# Patient Record
Sex: MALE | Race: WHITE | NOT HISPANIC OR LATINO | Employment: OTHER | URBAN - METROPOLITAN AREA
[De-identification: names, ages, dates, MRNs, and addresses within clinical notes are randomized per-mention and may not be internally consistent; named-entity substitution may affect disease eponyms.]

---

## 2022-09-19 LAB — HCV AB SER-ACNC: NEGATIVE

## 2023-05-24 ENCOUNTER — TELEMEDICINE (OUTPATIENT)
Dept: FAMILY MEDICINE CLINIC | Facility: CLINIC | Age: 73
End: 2023-05-24

## 2023-05-24 DIAGNOSIS — Z74.8 ASSISTANCE NEEDED WITH TRANSPORTATION: ICD-10-CM

## 2023-05-24 DIAGNOSIS — Z89.432 S/P TRANSMETATARSAL AMPUTATION OF FOOT, LEFT (HCC): ICD-10-CM

## 2023-05-24 DIAGNOSIS — Z79.4 TYPE 2 DIABETES MELLITUS WITHOUT COMPLICATION, WITH LONG-TERM CURRENT USE OF INSULIN (HCC): Primary | ICD-10-CM

## 2023-05-24 DIAGNOSIS — E11.9 TYPE 2 DIABETES MELLITUS WITHOUT COMPLICATION, WITH LONG-TERM CURRENT USE OF INSULIN (HCC): Primary | ICD-10-CM

## 2023-05-24 DIAGNOSIS — E78.2 MIXED HYPERLIPIDEMIA: ICD-10-CM

## 2023-05-24 PROBLEM — R91.1 RIGHT UPPER LOBE PULMONARY NODULE: Status: ACTIVE | Noted: 2023-01-16

## 2023-05-24 PROBLEM — E11.42 DIABETIC POLYNEUROPATHY ASSOCIATED WITH TYPE 2 DIABETES MELLITUS (HCC): Status: ACTIVE | Noted: 2019-02-18

## 2023-05-24 PROBLEM — Z87.898 HISTORY OF ULCERATION: Status: ACTIVE | Noted: 2021-04-10

## 2023-05-24 PROBLEM — R26.9 GAIT ABNORMALITY: Status: ACTIVE | Noted: 2022-12-20

## 2023-05-24 PROBLEM — I70.25 ATHEROSCLEROSIS OF NATIVE ARTERIES OF THE EXTREMITIES WITH ULCERATION (HCC): Status: ACTIVE | Noted: 2022-10-29

## 2023-05-24 PROBLEM — M62.84 SARCOPENIA: Status: ACTIVE | Noted: 2022-12-16

## 2023-05-24 PROBLEM — L97.519 ULCER OF RIGHT FOOT (HCC): Status: ACTIVE | Noted: 2020-07-29

## 2023-05-24 RX ORDER — INSULIN ASPART 100 [IU]/ML
10 INJECTION, SOLUTION INTRAVENOUS; SUBCUTANEOUS
Qty: 9 ML | Refills: 3 | Status: SHIPPED | OUTPATIENT
Start: 2023-05-24 | End: 2023-06-23

## 2023-05-24 RX ORDER — ROSUVASTATIN CALCIUM 10 MG/1
10 TABLET, COATED ORAL DAILY
COMMUNITY
Start: 2022-10-20 | End: 2023-10-20

## 2023-05-24 RX ORDER — ASPIRIN 81 MG/1
81 TABLET, CHEWABLE ORAL DAILY
COMMUNITY
Start: 2022-10-20 | End: 2023-10-20

## 2023-05-24 NOTE — PROGRESS NOTES
Virtual Regular Visit    Verification of patient location:    Patient is located at Home in the following state in which I hold an active license [place]      Assessment/Plan:    Problem List Items Addressed This Visit        Endocrine    Type 2 diabetes mellitus, with long-term current use of insulin ([facility] ) - Primary     Current on insulin; due for labs; advised needs in office follow up due to extremity risk given hx of amputation; will refer to complex care management   Lab Results   Component Value Date    HGBA1C 6 8 (H) 01/04/2023            Relevant Medications    insulin aspart (NovoLOG FlexPen) 100 UNIT/ML injection pen    Other Relevant Orders    Albumin / creatinine urine ratio    Comprehensive metabolic panel    Hemoglobin A1C    Lipid Panel with Direct LDL reflex    CBC and differential    TSH, 3rd generation with Free T4 reflex    Ambulatory Referral to Complex Care Management Program       Other    S/P transmetatarsal amputation of foot, left (HCC)     Has not followed up with orthopedics as recommended due to transportation issues; still has home care; advised need for evaluation of foot by surgeon and f/u in office to ensure appropriate healing since surgery was months ago; referred to resources in network as transportation seems to be limiting factor in seeking appropriate care; discussed risks of not having appropriate f/u and evaluation to ensure no infection and appropriate healing         Relevant Orders    Ambulatory Referral to Complex Care Management Program    Ambulatory Referral to Podiatry    Mixed hyperlipidemia     Advised statin which he had not been taking; check labs         Relevant Medications    rosuvastatin (CRESTOR) 10 MG tablet    Assistance needed with transportation     Due to recent foot amputation and wife had recent CVA; they are currently homebound without family or friend resources to help with transportation and ensure patient re-establishes to get appropriate care Relevant Orders    Ambulatory Referral to Complex Care Management Program            Reason for visit is No chief complaint on file  Encounter provider Jcarlos Salas DO    Provider located at [facility] [address]  [address] [facility] [address] [phone]      Recent Visits  Date Type Provider Dept   05/24/23 Telemedicine Jcarlos Salas DO [id number] [address] recent visits within past 7 days and meeting all other requirements  Future Appointments  No visits were found meeting these conditions  Showing future appointments within next 150 days and meeting all other requirements       The patient was identified by name and date of birth  Mckenzie Brewer was informed that this is a telemedicine visit and that the visit is being conducted through the [employer]  He agrees to proceed     My office door was closed  No one else was in the room  He acknowledged consent and understanding of privacy and security of the video platform  The patient has agreed to participate and understands they can discontinue the visit at any time  Patient is aware this is a billable service  Subjective  Mckenzie Brewer is a 72 y o  male is to establish care  Patient presents virtually today to establish care  Has not been to doctor since recent post-op follow up due to transportation issues  He is unable to drive and wife recently had stroke  Has been having home care for wound for recent metatarsal foot amputation  Patient had surgery in December and is in wheelchair  Had debridement and amputation at that time  He is having difficulty with mobility  Unable to come to office per patient  Has not had appropriate follow up with orthopedics per chart review and has not scheduled f/u appointments despite outreach to the patient  Patient is on insulin for DM  No recent labs  -200  Sometimes a little higher if eating carbs   Tries do do Mediterranean diet as he can  Has not been taking statin  He does take novolog TID 10-12 units and basaglar 12 units daily  History reviewed  No pertinent past medical history  History reviewed  No pertinent surgical history  Current Outpatient Medications   Medication Sig Dispense Refill   • aspirin 81 mg chewable tablet Chew 81 mg daily     • insulin aspart (NovoLOG FlexPen) 100 UNIT/ML injection pen Inject 10 Units under the skin 3 (three) times a day with meals 9 mL 3   • rosuvastatin (CRESTOR) 10 MG tablet Take 10 mg by mouth daily       No current facility-administered medications for this visit  Allergies   Allergen Reactions   • Pollen Extract Itching       Review of Systems   Constitutional: Negative for chills and fever  Respiratory: Negative for shortness of breath  Cardiovascular: Negative for chest pain  Musculoskeletal: Positive for gait problem  Video Exam    There were no vitals filed for this visit  Physical Exam  Constitutional:       Appearance: He is normal weight  HENT:      Head: Normocephalic and atraumatic  Neurological:      General: No focal deficit present  Mental Status: He is alert and oriented to person, place, and time  Psychiatric:         Mood and Affect: Mood normal          Behavior: Behavior normal          Thought Content:  Thought content normal          Judgment: Judgment normal           Visit Time  Total Visit Duration: 20

## 2023-05-25 RX ORDER — INSULIN GLARGINE 100 [IU]/ML
INJECTION, SOLUTION SUBCUTANEOUS
COMMUNITY
Start: 2023-04-12

## 2023-05-25 NOTE — ASSESSMENT & PLAN NOTE
Due to recent foot amputation and wife had recent CVA; they are currently homebound without family or friend resources to help with transportation and ensure patient re-establishes to get appropriate care

## 2023-05-25 NOTE — ASSESSMENT & PLAN NOTE
Current on insulin; due for labs; advised needs in office follow up due to extremity risk given hx of amputation; will refer to complex care management   Lab Results   Component Value Date    HGBA1C 6 8 (H) 01/04/2023

## 2023-05-25 NOTE — ASSESSMENT & PLAN NOTE
Has not followed up with orthopedics as recommended due to transportation issues; still has home care; advised need for evaluation of foot by surgeon and f/u in office to ensure appropriate healing since surgery was months ago; referred to resources in network as transportation seems to be limiting factor in seeking appropriate care; discussed risks of not having appropriate f/u and evaluation to ensure no infection and appropriate healing

## 2023-05-26 ENCOUNTER — PATIENT OUTREACH (OUTPATIENT)
Dept: FAMILY MEDICINE CLINIC | Facility: CLINIC | Age: 73
End: 2023-05-26

## 2023-05-26 DIAGNOSIS — Z71.89 COMPLEX CARE COORDINATION: Primary | ICD-10-CM

## 2023-05-26 NOTE — PROGRESS NOTES
Outpatient Care Management Note:    New direct referral received from Dr Oliva Askew  Patient needs transportation and home services  He is in wheelchair and his wife had a recent stroke  CM will place a referral to social work to address

## 2023-05-31 ENCOUNTER — PATIENT OUTREACH (OUTPATIENT)
Dept: FAMILY MEDICINE CLINIC | Facility: CLINIC | Age: 73
End: 2023-05-31

## 2023-05-31 NOTE — PROGRESS NOTES
DARSHAN LO received a referral from RN CM as patient is in need of transportation resources and help in home as his wife recently had a stroke  Patient is wheelchair bound  DARSHAN LO reviewed patient's chart and called patient ([phone])  Patient did not answer, DARSHAN LO left a message including DARSHAN LO contact information and requested a call back  DARSHAN LO will attempt to outreach again at a later date

## 2023-06-09 ENCOUNTER — TELEPHONE (OUTPATIENT)
Dept: FAMILY MEDICINE CLINIC | Facility: CLINIC | Age: 73
End: 2023-06-09

## 2023-06-09 ENCOUNTER — PATIENT OUTREACH (OUTPATIENT)
Dept: FAMILY MEDICINE CLINIC | Facility: CLINIC | Age: 73
End: 2023-06-09

## 2023-06-09 NOTE — PROGRESS NOTES
Outpatient Care Management Note:    Care manager called Logan Mccormack, introduced myself and the care management program   Logan Mccormack is being followed by [facility] for dressing changes every other day  They are doing dressing changes to his left foot after his toe amputation in December  CM reviewed that Dr Meri Gunderson feels he needs to follow up with his orthopedic surgeon  Logan Mccormack states that he is not going back to [facility]  He was unhappy with his care  Dr Meri Gunderson did refer him to podiatry and he is willing to see Dr Raven Vazquez  He will call to schedule  CM reinforced the importance of follow up to prevent infection, monitor healing etc   Logan Mccormack does need transportation assistance  He is in a wheelchair and his wife recently had a stroke  oLgan Mccormack states he is able to walk with a walker  CM did review that our  tried to contact him  He will return her call to see if he is eligible for any services  We completed a med review  Logan Mccormack is not taking his crestor or his aspirin  He states the aspirin upsets his stomach  I instructed him to take it with food  He did not have a reason for not taking the crestor  CM did review his increased risk of stroke and complications related to diabetes  He will consider taking his medications as iwllian Latham checks blood sugars  This morning his sugar was 124  Yesterday, it was 111  He states that he is taking his insulin  He does self adjust his dose based off his sugar numbers  He is concerned that he was told he can not refill his novolog  He does not know why  Currently, he still has about 1-2 months of novolog insulin at home  I instructed him to call [facility] and determine the reason  It may be related to his insurance  CM also reviewed that Logan Mccormack needs to complete his PCP blood work  I gave him the contact information for mobile lab  He will check with his insurance to be sure he can use [email or web address]  Cm gave Logan Mccormack my contact information   He will call with any questions

## 2023-06-16 LAB — HBA1C MFR BLD HPLC: 8.1 %

## 2023-06-19 ENCOUNTER — PATIENT OUTREACH (OUTPATIENT)
Dept: FAMILY MEDICINE CLINIC | Facility: CLINIC | Age: 73
End: 2023-06-19

## 2023-06-19 LAB
CREAT ?TM UR-SCNC: 105.3 UMOL/L
EXT ALBUMIN URINE RANDOM: 1.2
MICROALBUMIN/CREAT UR: 11.4 MG/G{CREAT}

## 2023-06-19 NOTE — PROGRESS NOTES
DARSHAN LO received a referral from RN CM as patient is in need of transportation resources and help in home as his wife recently had a stroke  Patient is wheelchair bound  DARSHAN LO reviewed patient's chart and called patient ([phone]) a second time  Patient did not answer, DARSHAN LO left a message including DARSHAN LO contact information and requested a call back  DARSHAN LO will send unable to reach letter via [employer]  DARSHAN LO will close due to being unable to reach  Please re consult DARSHAN LO as needed

## 2023-06-19 NOTE — LETTER
[id number]   [facility] [address]  [phone]    Re: Aristeo Mae to Reach   6/19/2023       Dear Kamran cisneros [address] [facility] [address] [facility] Work  and wanted to be certain you had information to contact me should you desire assistance with or have questions about non-medical aspects of your care such as [but not limited to] medical insurance, housing, transportation, material needs, or emergency needs  If I do not have an answer I will assist you in finding the appropriate agency or individual who can help  Please feel free to contact me at [phone]  Thank You      Sincerely,         Faviola Kathleen MSMAGDA, [facility]

## 2023-06-20 ENCOUNTER — TELEPHONE (OUTPATIENT)
Dept: FAMILY MEDICINE CLINIC | Facility: CLINIC | Age: 73
End: 2023-06-20

## 2023-06-20 ENCOUNTER — PATIENT OUTREACH (OUTPATIENT)
Dept: FAMILY MEDICINE CLINIC | Facility: CLINIC | Age: 73
End: 2023-06-20

## 2023-06-20 NOTE — PROGRESS NOTES
DARSHAN LO received a voicemail from patient's wife, Tosinzen Crystaldaniel ([phone])  DARSHAN LO attempted to call Tosin Bang back  Patient answered and DARSHAN discussed initial reason for referral  Patient seemed to be unsure of transportation and help in home needs  Patient stated he will have his wife call DARSHAN LO back  DARSHAN LO will await for a call back from Tosin Bang

## 2023-06-20 NOTE — TELEPHONE ENCOUNTER
----- Message from Harper Reeder, [address] sent at 6/20/2023  1:11 PM EDT -----  Regarding: appt time  I changed appointment time 7/6/23 from 9:40 to 9:20 to allow more time with Dr Mindi Gomez  Please call patient to let him know

## 2023-06-23 ENCOUNTER — PATIENT OUTREACH (OUTPATIENT)
Dept: FAMILY MEDICINE CLINIC | Facility: CLINIC | Age: 73
End: 2023-06-23

## 2023-06-23 NOTE — PROGRESS NOTES
Outpatient Care Management Note:    Care manager called Mir Parker  He is still being followed by [place]  He states that his foot is improving  He is able to shower 3 days per week  CM reminded him that he needs post operative follow up  Mir Parker refuses to go back to [address] [place] [address] [address] orthopedics  He has considered seeing Dr Jason Dumont, podiatry, but has not scheduled  CM reviewed that Dr Jason Dumont is only in on Wednesdays  I recommended he see Dr Sowmya Oro on 7/6 and then work on scheduling Dr Jason Dumont  Mir Parker is unsure how he will get to his office appt  I reviewed that our  tried to call him to assist with transportation needs  Mir Parker declined Souleymane Ruiz  He wants to ask a friend  He would need a wheelchair once he gets to office  CM will verify if there are wheelchairs downstairs  Mir Parker states his blood sugars are good  This morning it was 121  His [id number]=0 1 on 6/16/23  Mir Parker continues to Not take his crestor or aspirin  I reinforced why these medications are important  Mir Parker is diabetic and at increased risk of stroke  He continues to decline taking meds as ordered  He will discuss with Dr Sowmya Oro at his appt  CM also asked if he followed up on why the pharmacy told him they could not refill his novolog  He will call the pharmacy and will call me back so we can resolve the issue before he runs out of his insulin  Vondanessa Johnston did complete his blood work  Mir Parker has my contact information and will call me back with any updates or questions  CM called Mir Troncosoberkley back and informed him that there are about 5 wheelchairs at the front entrance

## 2023-06-30 ENCOUNTER — RA CDI HCC (OUTPATIENT)
Dept: OTHER | Facility: HOSPITAL | Age: 73
End: 2023-06-30

## 2023-06-30 NOTE — PROGRESS NOTES
[facility]  coding opportunities       Chart reviewed, no opportunity found: CHART REVIEWED, NO OPPORTUNITY FOUND        Patients Insurance        Commercial Insurance: [employer]

## 2023-07-06 ENCOUNTER — TELEPHONE (OUTPATIENT)
Dept: ADMINISTRATIVE | Facility: OTHER | Age: 73
End: 2023-07-06

## 2023-07-06 ENCOUNTER — OFFICE VISIT (OUTPATIENT)
Dept: FAMILY MEDICINE CLINIC | Facility: CLINIC | Age: 73
End: 2023-07-06
Payer: COMMERCIAL

## 2023-07-06 VITALS
BODY MASS INDEX: 25.71 KG/M2 | WEIGHT: 173.6 LBS | HEART RATE: 96 BPM | OXYGEN SATURATION: 97 % | DIASTOLIC BLOOD PRESSURE: 70 MMHG | HEIGHT: 69 IN | SYSTOLIC BLOOD PRESSURE: 140 MMHG | TEMPERATURE: 98.5 F

## 2023-07-06 DIAGNOSIS — Z79.4 TYPE 2 DIABETES MELLITUS WITHOUT COMPLICATION, WITH LONG-TERM CURRENT USE OF INSULIN (HCC): Primary | ICD-10-CM

## 2023-07-06 DIAGNOSIS — R91.1 RIGHT UPPER LOBE PULMONARY NODULE: ICD-10-CM

## 2023-07-06 DIAGNOSIS — I70.25 ATHEROSCLEROSIS OF NATIVE ARTERIES OF THE EXTREMITIES WITH ULCERATION (HCC): ICD-10-CM

## 2023-07-06 DIAGNOSIS — E11.9 TYPE 2 DIABETES MELLITUS WITHOUT COMPLICATION, WITH LONG-TERM CURRENT USE OF INSULIN (HCC): Primary | ICD-10-CM

## 2023-07-06 DIAGNOSIS — L97.519 ULCER OF RIGHT FOOT, UNSPECIFIED ULCER STAGE (HCC): ICD-10-CM

## 2023-07-06 DIAGNOSIS — E11.319 DIABETIC RETINOPATHY OF BOTH EYES ASSOCIATED WITH TYPE 2 DIABETES MELLITUS, MACULAR EDEMA PRESENCE UNSPECIFIED, UNSPECIFIED RETINOPATHY SEVERITY (HCC): ICD-10-CM

## 2023-07-06 DIAGNOSIS — E11.42 DIABETIC POLYNEUROPATHY ASSOCIATED WITH TYPE 2 DIABETES MELLITUS (HCC): ICD-10-CM

## 2023-07-06 PROBLEM — J96.01 ACUTE HYPOXEMIC RESPIRATORY FAILURE (HCC): Status: ACTIVE | Noted: 2023-07-06

## 2023-07-06 PROBLEM — J96.01 ACUTE HYPOXEMIC RESPIRATORY FAILURE (HCC): Status: RESOLVED | Noted: 2023-07-06 | Resolved: 2023-07-06

## 2023-07-06 LAB
LEFT EYE DIABETIC RETINOPATHY: ABNORMAL
LEFT EYE IMAGE QUALITY: ABNORMAL
LEFT EYE MACULAR EDEMA: ABNORMAL
LEFT EYE OTHER RETINOPATHY: ABNORMAL
RIGHT EYE DIABETIC RETINOPATHY: ABNORMAL
RIGHT EYE IMAGE QUALITY: ABNORMAL
RIGHT EYE MACULAR EDEMA: ABNORMAL
RIGHT EYE OTHER RETINOPATHY: ABNORMAL
SEVERITY (EYE EXAM): ABNORMAL

## 2023-07-06 PROCEDURE — 99214 OFFICE O/P EST MOD 30 MIN: CPT | Performed by: FAMILY MEDICINE

## 2023-07-06 PROCEDURE — 92250 FUNDUS PHOTOGRAPHY W/I&R: CPT | Performed by: FAMILY MEDICINE

## 2023-07-06 RX ORDER — ROSUVASTATIN CALCIUM 5 MG/1
5 TABLET, COATED ORAL DAILY
Qty: 90 TABLET | Refills: 3 | Status: SHIPPED | OUTPATIENT
Start: 2023-07-06

## 2023-07-06 NOTE — PROGRESS NOTES
Assessment/Plan:     1. Type 2 diabetes mellitus without complication, with long-term current use of insulin (HCC)  Assessment & Plan:  A1C increased from last check; likely this is related to his diet changes which we reviewed diet modifications need to keep BS controlled; if continues to have high BS will need to increase basaglar; defers referral to endocrinology; c/w complex care management for concern of difficulty with follow up  Lab Results   Component Value Date    HGBA1C 8.1 (H) 06/16/2023       Orders:  -     IRIS Diabetic eye exam  -     Ambulatory Referral to Podiatry; Future  -     Comprehensive metabolic panel; Future; Expected date: 10/06/2023  -     Hemoglobin A1C; Future; Expected date: 10/06/2023  -     Lipid Panel with Direct LDL reflex; Future; Expected date: 10/06/2023    2. Ulcer of right foot, unspecified ulcer stage ([address])  Assessment & Plan:  Has home wound care; refused exam today due to refusal of removing wound care dressing; advised need to have wound care f/u with appearance and stressed need for podiatry ASAP; R foot appears to have no extensive ulcerations or infection but has poor circulation and sensory loss      3. Atherosclerosis of native arteries of the extremities with ulceration ([address])  Assessment & Plan:  Stressed importance of ASA and statin; more agreeable to take lower dose statin so changed dosage to have some compliance; f/u with vascular surgery    Orders:  -     rosuvastatin (CRESTOR) 5 mg tablet; Take 1 tablet (5 mg total) by mouth daily  -     Ambulatory Referral to Vascular Surgery; Future    4.  Diabetic polyneuropathy associated with type 2 diabetes mellitus ([address])  Assessment & Plan:  Overall patient has had poor follow up since foot surgery, this likely is multifactorial but mostly concerns with wife at home and transportation issues; he is following with complex care management to f/u with patient; pt refused exam of left foot today; stressed importance of f/u with podiatry; right foot shows healing superficial pressure/erythema; c/w wound care  Lab Results   Component Value Date    HGBA1C 8.1 (H) 06/16/2023         5. Right upper lobe pulmonary nodule  Assessment & Plan:  Advised need for repeat CT scan    Orders:  -     CT chest wo contrast; Future; Expected date: 07/06/2023        Subjective:      Patient ID: Amaris Ceballos is a 72 y.o. male. He does have home care helping with wound care. Patient admits he has been very sedentary at home. He does do home exercises on occasion. Has not followed up with podiatry or surgeon recently. DM: Patient states depending on his BS reading. If it is in range of 100-150, he will take 10-15 units. Patient is on basaglar at night: He takes 20-21 units. Pt had 1 low blood sugar of 60 a few weeks ago. BS has not been over 240. Mostly in 100s-150s if he is watching what he eats. He has been doing [employer] and neighbors who have helped him with getting groceries. Has not been taking asa or statin. Feels he does not need it. The following portions of the patient's history were reviewed and updated as appropriate: allergies, current medications, past family history, past medical history, past social history, past surgical history, and problem list.    Review of Systems   Constitutional: Negative for chills and fever. Musculoskeletal: Positive for gait problem. Skin: Positive for wound. Negative for color change and rash. Objective:      /70   Pulse 96   Temp 98.5 °F (36.9 °C)   Ht 5' 9.29" (1.76 m)   Wt 78.7 kg (173 lb 9.6 oz)   SpO2 97%   BMI 25.42 kg/m²          Physical Exam  Vitals reviewed. Constitutional:       General: He is not in acute distress. Appearance: Normal appearance. He is not ill-appearing, toxic-appearing or diaphoretic. HENT:      Head: Normocephalic and atraumatic. Eyes:      General: No scleral icterus. Right eye: No discharge.          Left eye: No discharge. Conjunctiva/sclera: Conjunctivae normal.   Cardiovascular:      Rate and Rhythm: Normal rate and regular rhythm. Pulses: Pulses are weak. Dorsalis pedis pulses are 1+ on the right side. Posterior tibial pulses are 1+ on the right side. Heart sounds: Normal heart sounds. No murmur heard. No gallop. Pulmonary:      Effort: Pulmonary effort is normal. No respiratory distress. Breath sounds: Normal breath sounds. No stridor. No wheezing, rhonchi or rales. Musculoskeletal:      Right lower leg: No edema. Left lower leg: No edema. Feet:      Right foot:      Skin integrity: Callus present. No erythema. Neurological:      General: No focal deficit present. Mental Status: He is alert and oriented to person, place, and time. Psychiatric:         Mood and Affect: Mood normal.         Behavior: Behavior normal.         Thought Content: Thought content normal.         Judgment: Judgment normal.       Diabetic Foot Exam    Patient's shoes and socks removed. Right Foot/Ankle   Right Foot Inspection  Skin Exam: callus and callus. No erythema. Sensory   Monofilament testing: absent    Vascular  Capillary refills: < 3 seconds  The right DP pulse is 1+. The right PT pulse is 1+.        Assign Risk Category  No deformity present  Loss of protective sensation  Weak pulses  Risk: 2

## 2023-07-06 NOTE — ASSESSMENT & PLAN NOTE
Has home wound care; refused exam today due to refusal of removing wound care dressing; advised need to have wound care f/u with appearance and stressed need for podiatry ASAP; R foot appears to have no extensive ulcerations or infection but has poor circulation and sensory loss

## 2023-07-06 NOTE — ASSESSMENT & PLAN NOTE
A1C increased from last check; likely this is related to his diet changes which we reviewed diet modifications need to keep BS controlled; if continues to have high BS will need to increase basaglar; defers referral to endocrinology; c/w complex care management for concern of difficulty with follow up  Lab Results   Component Value Date    HGBA1C 8.1 (H) 06/16/2023

## 2023-07-06 NOTE — ASSESSMENT & PLAN NOTE
Overall patient has had poor follow up since foot surgery, this likely is multifactorial but mostly concerns with wife at home and transportation issues; he is following with complex care management to f/u with patient; pt refused exam of left foot today; stressed importance of f/u with podiatry; right foot shows healing superficial pressure/erythema; c/w wound care  Lab Results   Component Value Date    HGBA1C 8.1 (H) 06/16/2023

## 2023-07-06 NOTE — ASSESSMENT & PLAN NOTE
Stressed importance of ASA and statin; more agreeable to take lower dose statin so changed dosage to have some compliance; f/u with vascular surgery

## 2023-07-06 NOTE — TELEPHONE ENCOUNTER
----- Message from Eliud Mendosa MA sent at 7/6/2023  9:26 AM EDT -----  Regarding: Care Gap Request  07/06/23 9:26 AM    Hello, our patient Danilo Galeano has had Hepatitis C completed/performed. Please assist in updating the patient chart by pulling the Care Everywhere (CE) document. The date of service is 9/19/22.      Thank you,  Eliud Mendosa PG [facility]

## 2023-07-07 ENCOUNTER — PATIENT OUTREACH (OUTPATIENT)
Dept: FAMILY MEDICINE CLINIC | Facility: CLINIC | Age: 73
End: 2023-07-07

## 2023-07-07 NOTE — TELEPHONE ENCOUNTER
Upon review of the In Basket request we were able to locate, review, and update the patient chart as requested for Hepatitis C . Any additional questions or concerns should be emailed to the Practice Liaisons via the appropriate education email address, please do not reply via In Basket.     Thank you  Alejandra Caal

## 2023-07-07 NOTE — PROGRESS NOTES
Outpatient Care Management Note:    Care manager called Modesta. I congratulated him on getting in to see Dr Mindy Claude. He has questions about mychart. I gave him the contact information for my chart questions. [phone] option 5. He will call regarding uploaded photos. Modesta did schedule with podiatry. I gave him the contact information for vascular. He will work to schedule. Lastly, we discussed the CAT scan. Modesta declined scheduling at this time, but will consider it once podiatry and vascular are completed. Modesta has my contact information and will call with any questions.

## 2023-07-28 ENCOUNTER — PATIENT OUTREACH (OUTPATIENT)
Dept: FAMILY MEDICINE CLINIC | Facility: CLINIC | Age: 73
End: 2023-07-28

## 2023-07-28 DIAGNOSIS — E11.9 TYPE 2 DIABETES MELLITUS WITHOUT COMPLICATION, WITH LONG-TERM CURRENT USE OF INSULIN (HCC): ICD-10-CM

## 2023-07-28 DIAGNOSIS — Z79.4 TYPE 2 DIABETES MELLITUS WITHOUT COMPLICATION, WITH LONG-TERM CURRENT USE OF INSULIN (HCC): ICD-10-CM

## 2023-07-28 RX ORDER — INSULIN GLARGINE 100 [IU]/ML
22 INJECTION, SOLUTION SUBCUTANEOUS DAILY
Qty: 19.8 ML | Refills: 0 | Status: SHIPPED | OUTPATIENT
Start: 2023-07-28 | End: 2023-10-26

## 2023-07-28 RX ORDER — INSULIN ASPART 100 [IU]/ML
10 INJECTION, SOLUTION INTRAVENOUS; SUBCUTANEOUS
Qty: 9 ML | Refills: 0 | Status: SHIPPED | OUTPATIENT
Start: 2023-07-28 | End: 2023-08-27

## 2023-07-28 NOTE — PROGRESS NOTES
Outpatient Care Management Note:    Care manager called Modesta. He has not scheduled with vascular. CM reinforced the reasons for following with vascular. He declined scheduling at this time. He states that he just wants to focus on his foot for now. CM reinforced that he needs good circulation for the foot to heal.  If he does not actively manage his medical issues, he could have complications like losing his foot. Modesta is not taking his aspirin or statin. He states that aspirin affects his stomach and statins have too many side effects like joint pain. Modesta is still being followed by [facility]. CM asked Modesta if he could have the nurse send her notes to Dr Kelly Barker to keep her updated. Mandy Hawley declined ongoing outreach. He will keep my contact information and will call with any questions.  CM will close the referral.

## 2023-08-09 ENCOUNTER — OFFICE VISIT (OUTPATIENT)
Dept: PODIATRY | Facility: CLINIC | Age: 73
End: 2023-08-09
Payer: COMMERCIAL

## 2023-08-09 VITALS
SYSTOLIC BLOOD PRESSURE: 127 MMHG | WEIGHT: 173 LBS | HEART RATE: 74 BPM | HEIGHT: 69 IN | DIASTOLIC BLOOD PRESSURE: 88 MMHG | BODY MASS INDEX: 25.62 KG/M2

## 2023-08-09 DIAGNOSIS — L97.411 DIABETIC ULCER OF RIGHT HEEL ASSOCIATED WITH TYPE 2 DIABETES MELLITUS, LIMITED TO BREAKDOWN OF SKIN (HCC): ICD-10-CM

## 2023-08-09 DIAGNOSIS — Z89.432 S/P TRANSMETATARSAL AMPUTATION OF FOOT, LEFT (HCC): ICD-10-CM

## 2023-08-09 DIAGNOSIS — I70.90 ATHEROSCLEROSIS: Primary | ICD-10-CM

## 2023-08-09 DIAGNOSIS — E11.9 TYPE 2 DIABETES MELLITUS WITHOUT COMPLICATION, WITH LONG-TERM CURRENT USE OF INSULIN (HCC): ICD-10-CM

## 2023-08-09 DIAGNOSIS — Z79.4 TYPE 2 DIABETES MELLITUS WITHOUT COMPLICATION, WITH LONG-TERM CURRENT USE OF INSULIN (HCC): ICD-10-CM

## 2023-08-09 DIAGNOSIS — E11.621 DIABETIC ULCER OF RIGHT HEEL ASSOCIATED WITH TYPE 2 DIABETES MELLITUS, LIMITED TO BREAKDOWN OF SKIN (HCC): ICD-10-CM

## 2023-08-09 PROCEDURE — 99203 OFFICE O/P NEW LOW 30 MIN: CPT | Performed by: PODIATRIST

## 2023-08-09 NOTE — PROGRESS NOTES
Assessment/Plan:    Explained to patient that he has an ulceration on the bottom of the right foot. Due to poor circulation it is unlikely that this will heal even though he is having aggressive wound care at home. I am also concerned that his TMA incision is opening up. Explained to patient that he is at risk for right lower extremity amputation. He was referred for arterial Doppler studies. He will likely need vascular intervention. Will be reassessed here in 4 weeks. He may continue with local wound care. No problem-specific Assessment & Plan notes found for this encounter. Diagnoses and all orders for this visit:    Atherosclerosis  -     VAS lower limb arterial duplex, complete bilateral; Future    S/P transmetatarsal amputation of foot, left (HCC)  -     Ambulatory Referral to Podiatry  -     VAS lower limb arterial duplex, complete bilateral; Future    Type 2 diabetes mellitus without complication, with long-term current use of insulin ([address])  -     Ambulatory Referral to Podiatry    Diabetic ulcer of right heel associated with type 2 diabetes mellitus, limited to breakdown of skin ([facility])  -     VAS lower limb arterial duplex, complete bilateral; Future          Subjective:      Patient ID: Sydnee Stewart is a 72 y.o. male. HPI     Patient, a 72-year-old type II diabetic with poor control presents for pedal examination. Patient has a bandage wrapped around his right heel. There is an open area being treated by a wound care nurse at home. Patient underwent a TMA left foot last year. The incision site appears to be opening. Patient is currently wearing surgical shoes on both feet and ambulates with a walker. Patient has no pain in either foot as he is insensate. Patient has been under the care of [facility].  He became dissatisfied with treatment leading to his visit here. He has known poor circulation.   He states that he underwent vascular intervention for the left lower extremity. I personally reviewed arterial Doppler taken at [facility]. The date of the Doppler study was 11/22/2022. The right foot revealed unremarkable PVRs. Dopplers waveforms were considered biphasic. Digital brachial index was 0.45. There is suspected calcification of vessels. On the left side, the digital brachial index was 0. I personally reviewed A1c dated 6/16/2023. It was 8.1. I personally reviewed an A1c dated 1/4/2023. It was 6.8. I personally reviewed an A1c dated 9/17/2022. It was 12.4. The following portions of the patient's history were reviewed and updated as appropriate: allergies, current medications, past family history, past medical history, past social history, past surgical history and problem list.    Review of Systems   Musculoskeletal: Positive for gait problem. Skin: Positive for wound. Neurological: Positive for weakness and numbness. Objective:      /88   Pulse 74   Ht 5' 9" (1.753 m)   Wt 78.5 kg (173 lb)   BMI 25.55 kg/m²          Physical Exam  Cardiovascular:      Pulses: Pulses are weak. Dorsalis pedis pulses are 0 on the right side. Posterior tibial pulses are 0 on the right side and 0 on the left side. Feet:      Right foot:      Skin integrity: Ulcer and dry skin present. Left foot: amputated          Diabetic Foot Exam    Patient's shoes and socks removed. Right Foot/Ankle   Right Foot Inspection  Skin Exam: dry skin and ulcer. Ulcer Size (cm): 1    Toe Exam: ROM and strength within normal limits. Sensory   Vibration: absent  Proprioception: intact  Monofilament testing: absent    Vascular  Capillary refills: elevated  The right DP pulse is 0. The right PT pulse is 0.      Right Toe  - Comprehensive Exam  Ecchymosis: none  Arch: normal  Hammertoes: absent  Claw Toes: absent  Swelling: none   Tenderness: none         Left Foot/Ankle  Left Foot Inspection  Amputation: amputation left foot (Comments: TMA)    Vascular  The left PT pulse is 0.      Left Toe  - Comprehensive Exam  Ecchymosis: none  Arch: normal  Hammertoes: absent  Claw toes: absent  Swelling: none   Tenderness: none             Assign Risk Category  No deformity present  Loss of protective sensation  Weak pulses  Risk: 3

## 2023-08-10 ENCOUNTER — TELEPHONE (OUTPATIENT)
Age: 73
End: 2023-08-10

## 2023-08-10 NOTE — TELEPHONE ENCOUNTER
Caller: [facility]    Doctor/Office: Dr. Kamron Cardenas    #: [phone]    Escalation: Last office visit Calling on behalf of [address] [facility]. Would like to know what the orders are from yesterdays visit for patient's wounds. Would like to be sure [facility] and Dr. Kathleen Coker are on the same page with care. Thank you.

## 2023-08-22 DIAGNOSIS — E11.9 TYPE 2 DIABETES MELLITUS WITHOUT COMPLICATION, WITH LONG-TERM CURRENT USE OF INSULIN (HCC): ICD-10-CM

## 2023-08-22 DIAGNOSIS — Z79.4 TYPE 2 DIABETES MELLITUS WITHOUT COMPLICATION, WITH LONG-TERM CURRENT USE OF INSULIN (HCC): ICD-10-CM

## 2023-08-22 RX ORDER — INSULIN GLARGINE 100 [IU]/ML
22 INJECTION, SOLUTION SUBCUTANEOUS DAILY
Qty: 19.8 ML | Refills: 0 | Status: SHIPPED | OUTPATIENT
Start: 2023-08-22 | End: 2023-08-25 | Stop reason: SDUPTHER

## 2023-08-25 ENCOUNTER — PATIENT MESSAGE (OUTPATIENT)
Dept: FAMILY MEDICINE CLINIC | Facility: CLINIC | Age: 73
End: 2023-08-25

## 2023-08-25 DIAGNOSIS — Z79.4 TYPE 2 DIABETES MELLITUS WITHOUT COMPLICATION, WITH LONG-TERM CURRENT USE OF INSULIN (HCC): ICD-10-CM

## 2023-08-25 DIAGNOSIS — E11.9 TYPE 2 DIABETES MELLITUS WITHOUT COMPLICATION, WITH LONG-TERM CURRENT USE OF INSULIN (HCC): ICD-10-CM

## 2023-08-25 RX ORDER — INSULIN GLARGINE 100 [IU]/ML
22 INJECTION, SOLUTION SUBCUTANEOUS DAILY
Qty: 19.8 ML | Refills: 0 | Status: SHIPPED | OUTPATIENT
Start: 2023-08-25 | End: 2023-11-23

## 2023-11-07 ENCOUNTER — PATIENT MESSAGE (OUTPATIENT)
Dept: FAMILY MEDICINE CLINIC | Facility: CLINIC | Age: 73
End: 2023-11-07

## 2023-11-07 DIAGNOSIS — E11.9 TYPE 2 DIABETES MELLITUS WITHOUT COMPLICATION, WITH LONG-TERM CURRENT USE OF INSULIN (HCC): Primary | ICD-10-CM

## 2023-11-07 DIAGNOSIS — Z79.4 TYPE 2 DIABETES MELLITUS WITHOUT COMPLICATION, WITH LONG-TERM CURRENT USE OF INSULIN (HCC): Primary | ICD-10-CM

## 2023-11-09 RX ORDER — INSULIN GLARGINE 100 [IU]/ML
22 INJECTION, SOLUTION SUBCUTANEOUS DAILY
Qty: 3 ML | Refills: 0
Start: 2023-11-09

## 2023-11-16 ENCOUNTER — VBI (OUTPATIENT)
Dept: ADMINISTRATIVE | Facility: OTHER | Age: 73
End: 2023-11-16

## 2023-12-11 ENCOUNTER — HOSPITAL ENCOUNTER (EMERGENCY)
Facility: HOSPITAL | Age: 73
Discharge: HOME | End: 2023-12-12
Attending: EMERGENCY MEDICINE
Payer: COMMERCIAL

## 2023-12-11 ENCOUNTER — APPOINTMENT (EMERGENCY)
Dept: RADIOLOGY | Facility: HOSPITAL | Age: 73
End: 2023-12-11
Payer: COMMERCIAL

## 2023-12-11 DIAGNOSIS — Z51.89 VISIT FOR WOUND CHECK: ICD-10-CM

## 2023-12-11 DIAGNOSIS — E11.9 TYPE 2 DIABETES MELLITUS WITHOUT COMPLICATION, WITH LONG-TERM CURRENT USE OF INSULIN (HCC): ICD-10-CM

## 2023-12-11 DIAGNOSIS — Z79.4 TYPE 2 DIABETES MELLITUS WITHOUT COMPLICATION, WITH LONG-TERM CURRENT USE OF INSULIN (HCC): ICD-10-CM

## 2023-12-11 DIAGNOSIS — L97.519 ULCER OF RIGHT FOOT (HCC): Primary | ICD-10-CM

## 2023-12-11 LAB
ALBUMIN SERPL BCP-MCNC: 3.6 G/DL (ref 3.5–5)
ALP SERPL-CCNC: 70 U/L (ref 34–104)
ALT SERPL W P-5'-P-CCNC: 31 U/L (ref 7–52)
ANION GAP SERPL CALCULATED.3IONS-SCNC: 8 MMOL/L
AST SERPL W P-5'-P-CCNC: 36 U/L (ref 13–39)
ATRIAL RATE: 69 BPM
ATRIAL RATE: 70 BPM
BASOPHILS # BLD MANUAL: 0 THOUSAND/UL (ref 0–0.1)
BASOPHILS NFR MAR MANUAL: 0 % (ref 0–1)
BILIRUB SERPL-MCNC: 0.54 MG/DL (ref 0.2–1)
BUN SERPL-MCNC: 17 MG/DL (ref 5–25)
CALCIUM SERPL-MCNC: 9 MG/DL (ref 8.4–10.2)
CARDIAC TROPONIN I PNL SERPL HS: 11 NG/L
CHLORIDE SERPL-SCNC: 99 MMOL/L (ref 96–108)
CO2 SERPL-SCNC: 25 MMOL/L (ref 21–32)
CREAT SERPL-MCNC: 0.82 MG/DL (ref 0.6–1.3)
EOSINOPHIL # BLD MANUAL: 0 THOUSAND/UL (ref 0–0.4)
EOSINOPHIL NFR BLD MANUAL: 0 % (ref 0–6)
ERYTHROCYTE [DISTWIDTH] IN BLOOD BY AUTOMATED COUNT: 13 % (ref 11.6–15.1)
FLUAV RNA RESP QL NAA+PROBE: NEGATIVE
FLUBV RNA RESP QL NAA+PROBE: NEGATIVE
GFR SERPL CREATININE-BSD FRML MDRD: 88 ML/MIN/1.73SQ M
GLUCOSE SERPL-MCNC: 222 MG/DL (ref 65–140)
HCT VFR BLD AUTO: 34.6 % (ref 36.5–49.3)
HGB BLD-MCNC: 11.6 G/DL (ref 12–17)
LACTATE SERPL-SCNC: 1.2 MMOL/L (ref 0.5–2)
LG PLATELETS BLD QL SMEAR: PRESENT
LYMPHOCYTES # BLD AUTO: 0.4 THOUSAND/UL (ref 0.6–4.47)
LYMPHOCYTES # BLD AUTO: 4 % (ref 14–44)
MCH RBC QN AUTO: 32.9 PG (ref 26.8–34.3)
MCHC RBC AUTO-ENTMCNC: 33.5 G/DL (ref 31.4–37.4)
MCV RBC AUTO: 98 FL (ref 82–98)
MONOCYTES # BLD AUTO: 0.89 THOUSAND/UL (ref 0–1.22)
MONOCYTES NFR BLD: 9 % (ref 4–12)
NEUTROPHILS # BLD MANUAL: 8.64 THOUSAND/UL (ref 1.85–7.62)
NEUTS BAND NFR BLD MANUAL: 11 % (ref 0–8)
NEUTS SEG NFR BLD AUTO: 76 % (ref 43–75)
OVALOCYTES BLD QL SMEAR: PRESENT
P AXIS: 72 DEGREES
P AXIS: 78 DEGREES
PLATELET # BLD AUTO: 204 THOUSANDS/UL (ref 149–390)
PLATELET BLD QL SMEAR: ADEQUATE
PMV BLD AUTO: 10.2 FL (ref 8.9–12.7)
POLYCHROMASIA BLD QL SMEAR: PRESENT
POTASSIUM SERPL-SCNC: 4 MMOL/L (ref 3.5–5.3)
PR INTERVAL: 164 MS
PR INTERVAL: 168 MS
PROCALCITONIN SERPL-MCNC: 1.7 NG/ML
PROT SERPL-MCNC: 7.5 G/DL (ref 6.4–8.4)
QRS AXIS: 57 DEGREES
QRS AXIS: 58 DEGREES
QRSD INTERVAL: 96 MS
QRSD INTERVAL: 96 MS
QT INTERVAL: 384 MS
QT INTERVAL: 392 MS
QTC INTERVAL: 414 MS
QTC INTERVAL: 420 MS
RBC # BLD AUTO: 3.53 MILLION/UL (ref 3.88–5.62)
RBC MORPH BLD: PRESENT
RSV RNA RESP QL NAA+PROBE: NEGATIVE
SARS-COV-2 RNA RESP QL NAA+PROBE: NEGATIVE
SODIUM SERPL-SCNC: 132 MMOL/L (ref 135–147)
T WAVE AXIS: 56 DEGREES
T WAVE AXIS: 56 DEGREES
VENTRICULAR RATE: 69 BPM
VENTRICULAR RATE: 70 BPM
WBC # BLD AUTO: 9.93 THOUSAND/UL (ref 4.31–10.16)

## 2023-12-11 PROCEDURE — 36415 COLL VENOUS BLD VENIPUNCTURE: CPT | Performed by: EMERGENCY MEDICINE

## 2023-12-11 PROCEDURE — 73630 X-RAY EXAM OF FOOT: CPT

## 2023-12-11 PROCEDURE — 99283 EMERGENCY DEPT VISIT LOW MDM: CPT

## 2023-12-11 PROCEDURE — 84145 PROCALCITONIN (PCT): CPT | Performed by: EMERGENCY MEDICINE

## 2023-12-11 PROCEDURE — 83605 ASSAY OF LACTIC ACID: CPT | Performed by: EMERGENCY MEDICINE

## 2023-12-11 PROCEDURE — 96374 THER/PROPH/DIAG INJ IV PUSH: CPT

## 2023-12-11 PROCEDURE — 84484 ASSAY OF TROPONIN QUANT: CPT | Performed by: EMERGENCY MEDICINE

## 2023-12-11 PROCEDURE — 93005 ELECTROCARDIOGRAM TRACING: CPT

## 2023-12-11 PROCEDURE — 80053 COMPREHEN METABOLIC PANEL: CPT | Performed by: EMERGENCY MEDICINE

## 2023-12-11 PROCEDURE — 96361 HYDRATE IV INFUSION ADD-ON: CPT

## 2023-12-11 PROCEDURE — 85007 BL SMEAR W/DIFF WBC COUNT: CPT | Performed by: EMERGENCY MEDICINE

## 2023-12-11 PROCEDURE — 0241U HB NFCT DS VIR RESP RNA 4 TRGT: CPT | Performed by: EMERGENCY MEDICINE

## 2023-12-11 PROCEDURE — NC001 PR NO CHARGE: Performed by: PODIATRIST

## 2023-12-11 PROCEDURE — 99284 EMERGENCY DEPT VISIT MOD MDM: CPT | Performed by: EMERGENCY MEDICINE

## 2023-12-11 PROCEDURE — 85027 COMPLETE CBC AUTOMATED: CPT | Performed by: EMERGENCY MEDICINE

## 2023-12-11 RX ORDER — CEPHALEXIN 500 MG/1
500 CAPSULE ORAL 4 TIMES DAILY
Qty: 40 CAPSULE | Refills: 0 | Status: SHIPPED | OUTPATIENT
Start: 2023-12-11 | End: 2023-12-21

## 2023-12-11 RX ORDER — ONDANSETRON 2 MG/ML
4 INJECTION INTRAMUSCULAR; INTRAVENOUS ONCE
Status: COMPLETED | OUTPATIENT
Start: 2023-12-11 | End: 2023-12-11

## 2023-12-11 RX ADMIN — SODIUM CHLORIDE 1000 ML: 0.9 INJECTION, SOLUTION INTRAVENOUS at 19:26

## 2023-12-11 RX ADMIN — ONDANSETRON 4 MG: 2 INJECTION INTRAMUSCULAR; INTRAVENOUS at 19:25

## 2023-12-12 VITALS
TEMPERATURE: 97.9 F | HEART RATE: 72 BPM | SYSTOLIC BLOOD PRESSURE: 173 MMHG | DIASTOLIC BLOOD PRESSURE: 72 MMHG | RESPIRATION RATE: 18 BRPM | OXYGEN SATURATION: 100 %

## 2023-12-12 RX ORDER — INSULIN GLARGINE 100 [IU]/ML
INJECTION, SOLUTION SUBCUTANEOUS
Qty: 15 ML | Refills: 0 | Status: SHIPPED | OUTPATIENT
Start: 2023-12-12 | End: 2023-12-13

## 2023-12-12 NOTE — ED PROVIDER NOTES
History  Chief Complaint   Patient presents with    Wound Check     Patient referred to ed by wound care nurse for worsening wound on right heel. Patient reports weakness beginning over the weekend and fever beginning today. Tylenol last taken at [address] [place] [address] is a 72 y.o. male presenting for complaint of right heel ulcer and recent fall. Patient has history of left foot amputation and regularly sees podiatry for foot care. He has been dealing with this right heel ulcer for over a year and receives wound care by home health care 3 times a week. Patient is currently living with his wife who is disabled after a stroke last year. He uses a walker and wheelchair to get around his home. Patient reports this morning he had a mechanical fall while he was in his bathroom. He notes he did hit his head one the wall. He was unable to get up after this fall and needed to call for help. When his home health aide arrived she decided he should be seen in the ED due to worsening right foot ulcer. He reports the wound has been worsening for past two days per the home health aid. He did notice a fever today. He is feeling generally unwell for the past few days but denying any specific symptoms. He has some sensation in his feet but is not complaining of any foot pain. No other complaints at this time. Wound Check         Prior to Admission Medications   Prescriptions Last Dose Informant Patient Reported? Taking?    Insulin Glargine Solostar (Basaglar KwikPen) 100 UNIT/ML SOPN   No No   Sig: Inject 0.22 mL (22 Units total) under the skin in the morning   aspirin 81 mg chewable tablet   Yes No   Sig: Chew 81 mg daily   Patient not taking: Reported on 6/9/2023   insulin aspart (NovoLOG FlexPen) 100 UNIT/ML injection pen   No No   Sig: Inject 10 Units under the skin 3 (three) times a day with meals   rosuvastatin (CRESTOR) 5 mg tablet   No No   Sig: Take 1 tablet (5 mg total) by mouth daily      Facility-Administered Medications: None       Past Medical History:   Diagnosis Date    Allergic     every spring    Arthritis     A few years ago    Cancer [facility])     Lymphoma -  time frame    Diabetes mellitus ([address])     Type 2    Shingles     About 10 years ago       Past Surgical History:   Procedure Laterality Date    EYE SURGERY      Two IOL's       Family History   Problem Relation Age of Onset    Arthritis Mother             Arthritis Father              I have reviewed and agree with the history as documented. E-Cigarette/Vaping     E-Cigarette/Vaping Substances     Social History     Tobacco Use    Smoking status: Former     Types: Cigarettes    Smokeless tobacco: Never   Substance Use Topics    Alcohol use: Yes     Comment: occasional beer or glass of wine - currently nothing    Drug use: Never       Review of Systems   Constitutional:  Positive for fatigue and fever. Respiratory:  Negative for chest tightness and shortness of breath. Gastrointestinal:  Negative for abdominal pain. Skin:  Positive for wound (right heel). All other systems reviewed and are negative. Physical Exam  Physical Exam  Vitals reviewed. Constitutional:       Appearance: He is well-developed. He is not diaphoretic. HENT:      Head: Normocephalic. Eyes:      General: No scleral icterus. Conjunctiva/sclera: Conjunctivae normal.      Pupils: Pupils are equal, round, and reactive to light. Neck:      Vascular: No JVD. Cardiovascular:      Rate and Rhythm: Normal rate and regular rhythm. Abdominal:      General: Bowel sounds are normal. There is no distension. Palpations: Abdomen is soft. Tenderness: There is no abdominal tenderness. There is no guarding or rebound. Musculoskeletal:         General: No tenderness or deformity. Normal range of motion. Cervical back: Normal range of motion and neck supple. Feet:       Left Lower Extremity: Left leg is amputated below ankle.    Feet: Right foot:      Skin integrity: Ulcer, skin breakdown, callus and dry skin present. Left foot:      Skin integrity: Callus and dry skin present. Lymphadenopathy:      Cervical: No cervical adenopathy. Skin:     General: Skin is warm. Neurological:      Mental Status: He is alert and oriented to person, place, and time.       Coordination: Coordination normal.         Vital Signs  ED Triage Vitals   Temperature Pulse Respirations Blood Pressure SpO2   12/11/23 1825 12/11/23 1815 12/11/23 1815 12/11/23 1815 12/11/23 1815   97.9 °F (36.6 °C) 84 16 124/66 98 %      Temp Source Heart Rate Source Patient Position - Orthostatic VS BP Location FiO2 (%)   12/11/23 1825 12/11/23 1815 12/11/23 1815 12/11/23 1815 --   Oral Monitor Lying Right arm       Pain Score       12/11/23 1815       No Pain           Vitals:    12/11/23 1930 12/11/23 2130 12/11/23 2300 12/12/23 0100   BP: 111/56 125/57 140/65 (!) 173/72   Pulse: 68 72 79 72   Patient Position - Orthostatic VS: Lying Lying Lying Lying         Visual Acuity      ED Medications  Medications   sodium chloride 0.9 % bolus 1,000 mL (0 mL Intravenous Stopped 12/11/23 2057)   ondansetron (ZOFRAN) injection 4 mg (4 mg Intravenous Given 12/11/23 1925)       Diagnostic Studies  Results Reviewed       Procedure Component Value Units Date/Time    Manual Differential(PHLEBS Do Not Order) [[id number]]  (Abnormal) Collected: 12/11/23 1920    Lab Status: Final result Specimen: Blood from Arm, Left Updated: 12/11/23 2207     Segmented % 76 %      Bands % 11 %      Lymphocytes % 4 %      Monocytes % 9 %      Eosinophils, % 0 %      Basophils % 0 %      Absolute Neutrophils 8.64 Thousand/uL      Lymphocytes Absolute 0.40 Thousand/uL      Monocytes Absolute 0.89 Thousand/uL      Eosinophils Absolute 0.00 Thousand/uL      Basophils Absolute 0.00 Thousand/uL      Total Counted --     RBC Morphology Present     Platelet Estimate Adequate     Large Platelet Present     Ovalocytes Present     Polychromasia Present    FLU/RSV/COVID - if FLU/RSV clinically relevant [[id number]]  (Normal) Collected: 12/11/23 1920    Lab Status: Final result Specimen: Nares from Nasopharyngeal Swab Updated: 12/11/23 2039     SARS-CoV-2 Negative     INFLUENZA A PCR Negative     INFLUENZA B PCR Negative     RSV PCR Negative    Narrative:      FOR PEDIATRIC PATIENTS - copy/paste COVID Guidelines URL to browser: [email or web address]    SARS-CoV-2 assay is a Nucleic Acid Amplification assay intended for the  qualitative detection of nucleic acid from SARS-CoV-2 in nasopharyngeal  swabs. Results are for the presumptive identification of SARS-CoV-2 RNA. Positive results are indicative of infection with SARS-CoV-2, the virus  causing COVID-19, but do not rule out bacterial infection or co-infection  with other viruses. Laboratories within the [facility] hospital and its  territories are required to report all positive results to the appropriate  public health authorities. Negative results do not preclude SARS-CoV-2  infection and should not be used as the sole basis for treatment or other  patient management decisions. Negative results must be combined with  clinical observations, patient history, and epidemiological information. This test has not been FDA cleared or approved. This test has been authorized by FDA under an Emergency Use Authorization  (EUA). This test is only authorized for the duration of time the  declaration that circumstances exist justifying the authorization of the  emergency use of an in vitro diagnostic tests for detection of SARS-CoV-2  virus and/or diagnosis of COVID-19 infection under section 564(b)(1) of  the Act, 21 U. S.C. 935YES-7(Q)(3), unless the authorization is terminated  or revoked sooner. The test has been validated but independent review by FDA  and CLIA is pending. Test performed using [employer]:  This RT-PCR assay targets N2,  a region unique to SARS-CoV-2. A conserved region in the E-gene was chosen  for pan-Sarbecovirus detection which includes SARS-CoV-2. According to CMS-2020-01-R, this platform meets the definition of high-throughput technology. Procalcitonin [[id number]]  (Abnormal) Collected: 12/11/23 1920    Lab Status: Final result Specimen: Blood from Arm, Left Updated: 12/11/23 2000     Procalcitonin 1.70 ng/ml     HS Troponin 0hr (reflex protocol) [[id number]]  (Normal) Collected: 12/11/23 1920    Lab Status: Final result Specimen: Blood from Arm, Left Updated: 12/11/23 1959     hs TnI 0hr 11 ng/L     Lactic acid, plasma (w/reflex if result > 2.0) [[id number]]  (Normal) Collected: 12/11/23 1920    Lab Status: Final result Specimen: Blood from Arm, Left Updated: 12/11/23 1955     LACTIC ACID 1.2 mmol/L     Narrative:      Result may be elevated if tourniquet was used during collection.     Comprehensive metabolic panel [[id number]]  (Abnormal) Collected: 12/11/23 1920    Lab Status: Final result Specimen: Blood from Arm, Left Updated: 12/11/23 1955     Sodium 132 mmol/L      Potassium 4.0 mmol/L      Chloride 99 mmol/L      CO2 25 mmol/L      ANION GAP 8 mmol/L      BUN 17 mg/dL      Creatinine 0.82 mg/dL      Glucose 222 mg/dL      Calcium 9.0 mg/dL      AST 36 U/L      ALT 31 U/L      Alkaline Phosphatase 70 U/L      Total Protein 7.5 g/dL      Albumin 3.6 g/dL      Total Bilirubin 0.54 mg/dL      eGFR 88 ml/min/1.73sq m     Narrative:      [place] guidelines for Chronic Kidney Disease (CKD):     Stage 1 with normal or high GFR (GFR > 90 mL/min/1.73 square meters)    Stage 2 Mild CKD (GFR = 60-89 mL/min/1.73 square meters)    Stage 3A Moderate CKD (GFR = 45-59 mL/min/1.73 square meters)    Stage 3B Moderate CKD (GFR = 30-44 mL/min/1.73 square meters)    Stage 4 Severe CKD (GFR = 15-29 mL/min/1.73 square meters)    Stage 5 End Stage CKD (GFR <15 mL/min/1.73 square meters)  Note: GFR calculation is accurate only with a steady state creatinine    CBC and differential [[id number]]  (Abnormal) Collected: 12/11/23 1920    Lab Status: Final result Specimen: Blood from Arm, Left Updated: 12/11/23 1949     WBC 9.93 Thousand/uL      RBC 3.53 Million/uL      Hemoglobin 11.6 g/dL      Hematocrit 34.6 %      MCV 98 fL      MCH 32.9 pg      MCHC 33.5 g/dL      RDW 13.0 %      MPV 10.2 fL      Platelets 137 Thousands/uL                    XR foot 3+ views RIGHT   Final Result by Staci Howe MD (12/12 0924)      Mild lucency at the posterior calcaneus adjacent to soft tissue ulceration. While this may possibly be artifactual due to overlying soft tissue, cannot exclude osteomyelitis. Consider MRI. The study was marked in [facility] for immediate notification. Workstation performed: [id number]                    Procedures  Procedures         ED Course  ED Course as of 12/13/23 1522   Mon Dec 11, 2023   2113 Admission for IV abx and likely surgical intervention recommended by myself and podiatry. After discussion of risks and benefits, pt refused any surgical intervention and also refused admission for IV abx. As such, will d/c with po keflex                                             Medical Decision Making  Amount and/or Complexity of Data Reviewed  Labs: ordered. Radiology: ordered. Risk  Prescription drug management.              Disposition  Final diagnoses:   Ulcer of right foot ([address])   Visit for wound check     Time reflects when diagnosis was documented in both MDM as applicable and the Disposition within this note       Time User Action Codes Description Comment    12/11/2023  7:48 PM Casey Payton Add [C39.577] Ulcer of right foot ([address])     12/11/2023  9:05 PM Casey Payton Add [Z51.89] Visit for wound check           ED Disposition       ED Disposition   Discharge    Condition   Stable    Date/Time   Mon Dec 11, 2023  9:05 PM    [address] discharge to home/self care.                   Follow-up Information       Follow up With Specialties Details Why Contact Info Additional Information    [facility]   [address]  [facility] [address] [address]  [address] [place] [address], [place] [address], [place], [place], [address] [place] [address]    [facility] Wound Care Follow up  [address] [id number]  [address] [facility], [address] [facility], [place], [place], [address] [facility]            Discharge Medication List as of 12/11/2023  9:06 PM        START taking these medications    Details   cephalexin (KEFLEX) 500 mg capsule Take 1 capsule (500 mg total) by mouth 4 (four) times a day for 10 days, Starting Mon 12/11/2023, Until Thu 12/21/2023, Normal           CONTINUE these medications which have NOT CHANGED    Details   aspirin 81 mg chewable tablet Chew 81 mg daily, Starting Thu 10/20/2022, Until Fri 10/20/2023, Historical Med      insulin aspart (NovoLOG FlexPen) 100 UNIT/ML injection pen Inject 10 Units under the skin 3 (three) times a day with meals, Starting Fri 7/28/2023, Until Sun 8/27/2023, Normal      !! Insulin Glargine Solostar (Basaglar KwikPen) 100 UNIT/ML SOPN Inject 0.22 mL (22 Units total) under the skin in the morning, Starting Thu 11/9/2023, No Print      rosuvastatin (CRESTOR) 5 mg tablet Take 1 tablet (5 mg total) by mouth daily, Starting Thu 7/6/2023, Normal      !! Basaglar KwikPen 100 units/mL SOPN Inject 0.22 mL (22 Units total) under the skin daily, Starting Tue 11/14/2023, Until Mon 2/12/2024, Normal       !! - Potential duplicate medications found. Please discuss with provider. No discharge procedures on file.     PDMP Review       None            ED Provider  Electronically Signed by          No discharge procedures on file.    PDMP Review       None            ED Provider  Electronically Signed by             Valentín Curran MD  12/21/23 011

## 2023-12-12 NOTE — DISCHARGE INSTR - AVS FIRST PAGE
Discharge Instructions - Podiatry    Weight Bearing Status: Non-weight bearing to right foot. WBAT to left foot                    Pain: Continue analgesics as directed    Follow-up appointment instructions: Please make an appointment within one week of discharge with Dr. Derrek Loco. Contact sooner if any increase in pain, or signs of infection occur    Wound Care: Leave dressings clean, dry, and intact between professional dressing changes    Nursing Instructions: Please apply Betadine soaked adaptic. Then cover with Gauze and secure with Kerlix and tape. Please change dressing every Monday, Wednesday, and Friday .

## 2023-12-12 NOTE — ED NOTES
Spoke with Nisa from [facility] for update on p/u time, Latha Olguin states she is working on and hopefully get transport soon.      Anthony Ruggiero RN  12/12/23 0668

## 2023-12-12 NOTE — CONSULTS
Podiatry - Consultation    Patient Information:   Mathieu Waddell 72 y.o. male MRN: [id number]  Unit/Bed#: ED-04 Encounter: [id number]  PCP: Maximiliano Guadalupe DO  Date of Admission:  12/11/2023  Date of Consultation: 12/11/23  Requesting Physician: Duke Ramírez MD      ASSESSMENT:    Mathieu Waddell is a 72 y.o. male with:    Right plantar heel ulcer - positive probe to bone  Type 2 diabetes mellitus with polyneuropathy  PAD  History left transmetatarsal amputation    PLAN:    Patient seen and evaluated at bedside in the ED. Right plantar heel ulcer probes to bone with clinical concern for osteomyelitis. Right foot radiograph images reviewed. Per my personal read, concern for osteomyelitis secondary to erosion of plantar calcaneus. Follow-up final read. Recommend MRI for further evaluation of osteomyelitis. Recommend admission to medicine for infection workup, vascular consultation, IV antibiotics and potential podiatric surgical intervention. Patient refusing admission against medical recommendations. Patient is aware that he is at high risk for infection, limb loss and loss of life. Patient expressed an understanding to this however continued to express a desire to be discharged from the ED. Recommend oral Keflex upon discharge secondary to patient refusing admittance for IV antibiotics at this time. Outpatient follow-up information and instructions placed in the chart. Patient strongly advised to follow-up regularly with a podiatrist outpatient for wound care as well as diabetic footcare management. Patient advised to present back to the emergency room should he experience more systemic symptoms of infection including but not limited to fevers, chills, nausea, vomiting, chest pain, shortness of breath. Labs and vitals reviewed. Patient is afebrile with no leukocytosis noted. Plan to continue local wound care consisting of Betadine, DSD. Wound care instructions placed.   Elevation on green Pt completed STEVEN on  10/7/2022   foam wedges or pillows when non-ambulatory  Rest of care per primary team.  Will discuss this plan with my attending and update as needed. Weightbearing status: Weight bearing as tolerated in heel offloading shoe to the right lower extremity     SUBJECTIVE:    History of Present Illness:    Ekaterina Tubbs is a 72 y.o. male who is originally admitted 12/11/2023 due to right heel ulcer after being recommended to present to the ED by his home health nurse. 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 We are consulted for right heel wound. Patient states that he does not regularly follow-up with a podiatrist however he has home health coming to his house 3 times a week for wound care to his right heel wound. He states that the nurse today recommended he come in after he fell and could not get back up in addition to the nurse noticing worsening of his right heel wound. Patient states that after his last podiatry appointment in August 2023 he was recommended to see vascular surgery and receive noninvasive vascular studies. Patient expresses that he did not feel like this would be beneficial as he has already had vascular intervention in the past which seemingly "did not work" and therefore did not follow-up. Patient reports that over the last 2 days he has not felt like himself and endorses fevers with lack of appetite. Patient denies any additional pedal complaints at this time. Patient denies shortness of breath, chest pain    Review of Systems:    Constitutional: Negative. HENT: Negative. Eyes: Negative. Respiratory: Negative. Cardiovascular: Negative. Gastrointestinal: Negative. Musculoskeletal: Right foot pain  Skin: Right foot wound  Neurological: Numbness and tingling  Psych: Negative.      Past Medical and Surgical History:     Past Medical History: Diagnosis Date    Allergic     every spring    Arthritis     A few years ago    Cancer [facility])     Lymphoma -  time frame    Diabetes mellitus ([address])     Type 2    Shingles     About 10 years ago       Past Surgical History:   Procedure Laterality Date    EYE SURGERY      Two IOL's       Meds/Allergies:    (Not in a hospital admission)      Allergies   Allergen Reactions    Pollen Extract Itching       Social History:     Marital Status: /Civil Union    Substance Use History:   Social History     Substance and Sexual Activity   Alcohol Use Yes    Comment: occasional beer or glass of wine - currently nothing     Social History     Tobacco Use   Smoking Status Former    Years: 3.00    Types: Cigarettes   Smokeless Tobacco Never     Social History     Substance and Sexual Activity   Drug Use Never       Family History:    Family History   Problem Relation Age of Onset    Arthritis Mother             Arthritis Father                  OBJECTIVE:    Vitals:   Blood Pressure: 111/56 (23)  Pulse: 68 (23)  Temperature: 97.9 °F (36.6 °C) (23)  Temp Source: Oral (23)  Respirations: 18 (23)  SpO2: 96 % (23)    Physical Exam:    General Appearance: Alert, cooperative, no distress. HEENT: Head normocephalic, atraumatic, without obvious abnormality. Heart: Normal rate and rhythm. Lungs: Non-labored breathing. No respiratory distress. Abdomen: Without distension. Psychiatric: AAOx3  Lower Extremity:  Vascular:   Right DP and PT pulses are Doppler signal.   Left DP and PT pulses are Doppler signal.   CRT < 3 seconds at the digits. +1/4 edema noted at bilateral lower extremities. Pedal hair is absent. Skin temperature is warm to right lower extremity     Musculoskeletal:  MMT is 4/5 in all muscle compartments bilaterally. ROM at the 1st MPJ and ankle joint are intact bilaterally with the leg extended.    Generalized pain on palpation of margarito-wound about right foot. Prior left TMA   No prior right foot amputations     Dermatological:  Lower extremity wound(s) as noted below:    Wound #: 1  Location: Right plantar heel  Length 2.0 cm: Width 2.5 cm: Depth 1.0 cm:   Deepest Tissue Noted in Base: Bone  Probe to Bone: Yes  Peripheral Skin Description: Attached  Granulation: 0% Fibrotic Tissue: 100% Necrotic Tissue: 0%   Drainage Amount: minimal, serosanguinous  Signs of Infection: Yes - positive probe to bone, localized edema and erythema     No purulence, no malodor, no ascending erythema, no crepitus, no fluctuance appreciated. Neurological:  Gross sensation is intact. Protective sensation is diminished. Patient Reports numbness and/or paresthesias. Additional data:     Lab Results: I have personally reviewed pertinent labs including:    Results from last 7 days   Lab Units 12/11/23  1920   WBC Thousand/uL 9.93   HEMOGLOBIN g/dL 11.6*   HEMATOCRIT % 34.6*   PLATELETS Thousands/uL 204     Results from last 7 days   Lab Units 12/11/23  1920   POTASSIUM mmol/L 4.0   CHLORIDE mmol/L 99   CO2 mmol/L 25   BUN mg/dL 17   CREATININE mg/dL 0.82   CALCIUM mg/dL 9.0   ALK PHOS U/L 70   ALT U/L 31   AST U/L 36           Cultures: I have personally reviewed pertinent cultures including:              Imaging: I have personally reviewed pertinent reports in PACS. EKG, Pathology, and Other Studies: I have personally reviewed pertinent reports. ** Please Note: Portions of the record may have been created with voice recognition software. Occasional wrong word or "sound a like" substitutions may have occurred due to the inherent limitations of voice recognition software. Read the chart carefully and recognize, using context, where substitutions have occurred.  **

## 2023-12-12 NOTE — TELEPHONE ENCOUNTER
Please schedule diabetes follow up. PCP requested 1 month f/u last visit and he should really be seen every 3 months with A1C over 8. He was also in the ER yesterday for foot ulcer so be sure he's got a follow up with his foot doctor as well.

## 2024-01-11 DIAGNOSIS — E11.9 TYPE 2 DIABETES MELLITUS WITHOUT COMPLICATION, WITH LONG-TERM CURRENT USE OF INSULIN (HCC): ICD-10-CM

## 2024-01-11 DIAGNOSIS — Z79.4 TYPE 2 DIABETES MELLITUS WITHOUT COMPLICATION, WITH LONG-TERM CURRENT USE OF INSULIN (HCC): ICD-10-CM

## 2024-01-11 NOTE — TELEPHONE ENCOUNTER
Received letter in mail for [facility] as of 1/1/2024 [employer] will not be covered with insurance [employer] Lantus

## 2024-01-12 RX ORDER — INSULIN DEGLUDEC INJECTION 100 U/ML
22 INJECTION, SOLUTION SUBCUTANEOUS DAILY
Qty: 15 ML | Refills: 0 | Status: SHIPPED | OUTPATIENT
Start: 2024-01-12

## 2024-01-16 ENCOUNTER — PATIENT OUTREACH (OUTPATIENT)
Dept: FAMILY MEDICINE CLINIC | Facility: CLINIC | Age: 74
End: 2024-01-16

## 2024-01-16 NOTE — PROGRESS NOTES
DARSHAN LO received a referral from marie's PCP. PCP is concerned (per note on 1/10/24) that patient is not getting to appointments and his overall health. DARSHAN LO reviewed patient's chart and called patient ( [phone]). Patient answered and DARSHAN LO introduced DARSHAN LO role and reason for calling. Patient stated that he does have a virtual appointment with PCP on 1/24/23. DARSHAN LO completed SOC psychosocial assessment with patient.     Patient is retired and lives with his wife who just recently had a stroke. His wife was working but is now on long term disability and may be retiring. Per patient no financial issues at this time. Patient does have a living will. Patient does utilize a wheel chair. Him and his wife do not drive at this time. Patient has a home health aide that comes Monday, Wednesday and Friday. Per patient, he receives this assistance through his Long term life insurance. Per patient the aide cannot drive him to appointments. He stated he does ask friends occasionally to drive him and he does have a brother but he lives an hour and a half away. Patient is in [facility] and stated he was not really interested in [employer] transport at this time. He stated wheel chair vans/ ambulances are expensive. He stated it is hard to get out of the house. DARSHAN LO tried encourage patient to consider [facility] but is unsure at this time. DARSHAN LO discussed with patient that he can call DARSHAN LO in the future if interested. DARSHAN LO if interested. Patient gave verbal understanding. DARSHAN LO will close and send inTravel Appeal message to PCP regarding.

## 2024-01-17 ENCOUNTER — PATIENT MESSAGE (OUTPATIENT)
Dept: FAMILY MEDICINE CLINIC | Facility: CLINIC | Age: 74
End: 2024-01-17

## 2024-01-24 ENCOUNTER — TELEMEDICINE (OUTPATIENT)
Dept: FAMILY MEDICINE CLINIC | Facility: CLINIC | Age: 74
End: 2024-01-24
Payer: COMMERCIAL

## 2024-01-24 ENCOUNTER — PATIENT OUTREACH (OUTPATIENT)
Dept: FAMILY MEDICINE CLINIC | Facility: CLINIC | Age: 74
End: 2024-01-24

## 2024-01-24 DIAGNOSIS — Z74.8 ASSISTANCE NEEDED WITH TRANSPORTATION: ICD-10-CM

## 2024-01-24 DIAGNOSIS — E11.42 DIABETIC POLYNEUROPATHY ASSOCIATED WITH TYPE 2 DIABETES MELLITUS (HCC): Primary | ICD-10-CM

## 2024-01-24 DIAGNOSIS — L97.519 ULCER OF RIGHT FOOT, UNSPECIFIED ULCER STAGE (HCC): ICD-10-CM

## 2024-01-24 DIAGNOSIS — R26.9 GAIT ABNORMALITY: ICD-10-CM

## 2024-01-24 PROCEDURE — 99214 OFFICE O/P EST MOD 30 MIN: CPT | Performed by: FAMILY MEDICINE

## 2024-01-24 NOTE — ASSESSMENT & PLAN NOTE
Advised patient we have resources to help with transportation; will refer to complex care management to ensure compliant with DM control and f/u on vascular surgery referral; will refer to social work as I have concerns there may be confounding factors as far as care with wife that is preventing patient to come to office agreeable now to only virtual visits which is not ideal for his current wound care as he has not seen any specialists since or after last surgery saw podiatry and recommended vascular evaluation

## 2024-01-24 NOTE — ASSESSMENT & PLAN NOTE
Advised importance of f/u with vascular surgery due to concerns of poor wound healing; c/w home wound care; declining visits at this time but agrees to send updated pictures; discussed limitations of appropriate care of his foot this way; per photographs does appear improved from ER visit

## 2024-01-24 NOTE — PROGRESS NOTES
Virtual Regular Visit    Verification of patient location:    Patient is located at Home in the following state in which I hold an active license [place]      Assessment/Plan:    Problem List Items Addressed This Visit       Diabetic polyneuropathy associated with type 2 diabetes mellitus (HCC) - Primary     Advised repeat labs to assess DM control as per pt BS have been improved however his DM control may be affecting wound healing; c/w lantus; holding novolog due to hypoglycemic episodes; will do home draws  Lab Results   Component Value Date    HGBA1C 8.1 (H) 06/16/2023            Relevant Orders    Albumin / creatinine urine ratio    Comprehensive metabolic panel    Hemoglobin A1C    Ambulatory Referral to Complex Care Management Program    Ulcer of right foot (HCC)     Advised importance of f/u with vascular surgery due to concerns of poor wound healing; c/w home wound care; declining visits at this time but agrees to send updated pictures; discussed limitations of appropriate care of his foot this way; per photographs does appear improved from ER visit         Relevant Orders    Ambulatory Referral to Complex Care Management Program    Ambulatory Referral to Vascular Surgery    Gait abnormality     Currently wheelchair bound         Relevant Orders    Ambulatory Referral to Complex Care Management Program    Ambulatory Referral to Vascular Surgery    Assistance needed with transportation     Advised patient we have resources to help with transportation; will refer to complex care management to ensure compliant with DM control and f/u on vascular surgery referral; will refer to social work as I have concerns there may be confounding factors as far as care with wife that is preventing patient to come to office agreeable now to only virtual visits which is not ideal for his current wound care as he has not seen any specialists since or after last surgery saw podiatry and recommended vascular evaluation          Relevant Orders    Ambulatory Referral to Complex Care Management Program    Ambulatory Referral to Social Work Care Management Program         Depression Screening and Follow-up Plan: Patient was screened for depression during today's encounter. They screened negative with a PHQ-2 score of 0.    Falls Plan of Care: not completed because patient not ambulatory, bed ridden, immobile, confined to chair, or wheelchair bound. Recommended assistive device to help with gait and balance.       Reason for visit is   Chief Complaint   Patient presents with    Virtual Regular Visit          Encounter provider Tiffanie Lomas DO    Provider located at [facility]  [address]  [place] [address] [phone]      Recent Visits  No visits were found meeting these conditions.  Showing recent visits within past 7 days and meeting all other requirements  Today's Visits  Date Type Provider Dept   01/24/24 Telemedicine Tiffanie Lomas DO [facility]   Showing today's visits and meeting all other requirements  Future Appointments  No visits were found meeting these conditions.  Showing future appointments within next 150 days and meeting all other requirements       The patient was identified by name and date of birth. Yeison Doll was informed that this is a telemedicine visit and that the visit is being conducted through the Epic Embedded platform. He agrees to proceed..  My office door was closed. No one else was in the room.  He acknowledged consent and understanding of privacy and security of the video platform. The patient has agreed to participate and understands they can discontinue the visit at any time.    Patient is aware this is a billable service.     Subjective  Yeison Doll is a 74 y.o. male  .        Patient states he does have wound care coming to home. Seems as wound is healing. Concerns of himself and wife's mobility makes him want to  continue with virtual visits. Does not want to see specialists at this time as he does feel wound is improving wound home wound care. Using wheelchair to get around. States he does not want to come to office at this time due to difficulty finding transportation and prefers to stay in home.     DM: Patient states his readings have been good. He states they have been in the 140s-150s range sometimes lower. Has not had as much fluctuation in his blood sugar. Patient stopped the novolog as he was getting hypoglycemic episodes with. Trying to keep diet low carb and eat a lot of protein. He seems to doing okay with his BS levels with this diet.       Patient states his blood pressure has been in line. Home nursing has been taking it as it was elevated in ER. Improved since then.          Past Medical History:   Diagnosis Date    Allergic     every spring    Arthritis     A few years ago    Cancer (HCC)     Lymphoma - 1995 time frame    Diabetes mellitus (HCC)     Type 2    Shingles     About 10 years ago       Past Surgical History:   Procedure Laterality Date    EYE SURGERY      Two IOL's       Current Outpatient Medications   Medication Sig Dispense Refill    aspirin 81 mg chewable tablet Chew 81 mg daily (Patient not taking: Reported on 6/9/2023)      Insulin Glargine Solostar (Basaglar KwikPen) 100 UNIT/ML SOPN Inject 0.22 mL (22 Units total) under the skin in the morning 3 mL 0    rosuvastatin (CRESTOR) 5 mg tablet Take 1 tablet (5 mg total) by mouth daily 90 tablet 3     No current facility-administered medications for this visit.        Allergies   Allergen Reactions    Pollen Extract Itching       Review of Systems   Constitutional:  Negative for chills and fever.   Skin:  Positive for wound.       Video Exam    There were no vitals filed for this visit.    Physical Exam  Constitutional:       General: He is not in acute distress.     Appearance: Normal appearance. He is normal weight. He is not ill-appearing or  toxic-appearing.   Neurological:      General: No focal deficit present.      Mental Status: He is alert and oriented to person, place, and time.   Psychiatric:         Mood and Affect: Mood normal.         Behavior: Behavior normal.          Visit Time  Total Visit Duration: 20

## 2024-01-24 NOTE — PROGRESS NOTES
DARSHAN LO received another referral for patient regarding transportation as patient's PCP would like patient to come in person for a visit and not just virtual. DARSHAN LO spoke with patient on 1/16/24 and he was not interested in [facility]. DARSHAN LO called patient back again ([phone]). Patient did not answer, DARSHAN LO left a message including DARSHAN LO contact information and requested a call back. DARSHAN LO will attempt to outreach again at a later date.

## 2024-01-24 NOTE — ASSESSMENT & PLAN NOTE
Advised repeat labs to assess DM control as per pt BS have been improved however his DM control may be affecting wound healing; c/w lantus; holding novolog due to hypoglycemic episodes; will do home draws  Lab Results   Component Value Date    HGBA1C 8.1 (H) 06/16/2023

## 2024-01-26 ENCOUNTER — PATIENT OUTREACH (OUTPATIENT)
Dept: CASE MANAGEMENT | Facility: HOSPITAL | Age: 74
End: 2024-01-26

## 2024-01-26 NOTE — PROGRESS NOTES
Referral received and chart review performed. Pt needs include DM control follow up, vascular surgery referral, and transportation hardship per last PCP visit note with Dr. Lomas. Pt currently has nonhealing wound on heel, wheelchair bound and does not drive. Referral also placed to DARSHAN Syed with outreach completed.     Call placed to Yeison for care management. Introduced self as CM and explained reason for call. Yeison states that he would not like any services at this time. He states that he has a home care nurse that comes 3 days per week, provides wound care and will also check his sugar. We spoke about DM management. Yeison states that he checks is sugar twice per day and they have been good. He states that he has had only 1 high reading in the past 2 weeks at 171. This morning his blood sugar was 101. Denies symptoms of hypoglycemia.     Reviewed last PCP visit. Yeison states that he has a plan he is comfortable with moving forward. His foot wound has been healing slowly and he sends pictures to Dr. Lomas to update.   We spoke about importance of seeing vascular surgery/in person office visit for wound. Yeison states he does not feel this is necessary and knows this will come along with other testing and appointments that he does not want to pursue at this time. He would like to continue to watch his wound himself at this time.   States that Dr. Lomas ordered him blood work and he will obtain. Offered assistance with transportation. Yeison states that he is not interested. Will obtain labs with mobile lab which he has used before and he prefers.   Reminded Yeison  is consulted for transportation difficulties. Yeison continues to refuse services offered.     Offered contact information for Lis Cates RN CM that works with PCP office. Educated pt that should needs change or he would be agreeable to vascular consult or DM education to call. Yeison took contact info and said he will keep it as a  resource.     Note routed to DARSHAN Clancy CM to update on pt status with CM contact and Lis Cates RN CM.

## 2024-01-29 ENCOUNTER — PATIENT OUTREACH (OUTPATIENT)
Dept: FAMILY MEDICINE CLINIC | Facility: CLINIC | Age: 74
End: 2024-01-29

## 2024-01-29 NOTE — LETTER
[address]   [place] [address]  [phone]    Re: Unable to Reach   1/29/2024       Dear Yeison,    I am a [facility]  and wanted to be certain you had information to contact me should you desire assistance with or have questions about non-medical aspects of your care such as [but not limited to] medical insurance, housing, transportation, material needs, or emergency needs.  If I do not have an answer I will assist you in finding the appropriate agency or individual who can help.      Please feel free to contact me [phone]. Thank You.    Sincerely,         NOVA Clancy , LSW

## 2024-01-29 NOTE — PROGRESS NOTES
DARSHAN LO received another referral for patient regarding transportation as patient's PCP would like patient to come in person for a visit and not just virtual. DARSHAN LO spoke with patient on 1/16/24 and he was not interested in [employer]. DARSHAN LO called patient back again ([phone]) a second time. Patient did not answer, DARSHAN LO left a message including DARSHAN LO contact information and requested a call back. DARSHAN LO will send unable to reach letter via [employer]. DARSHAN LO will send patient's PCP an inbasket regarding as well. DARSHAN LO will close. Please re consult DARSHAN LO as needed.

## 2024-02-01 ENCOUNTER — TELEPHONE (OUTPATIENT)
Age: 74
End: 2024-02-01

## 2024-02-01 NOTE — TELEPHONE ENCOUNTER
Ketty who is with [facility] sees patient every M/W/F. She relayed that patient was seen in the ED in December for an Ulcer on the right foot/heel. She wanted to make the provider aware that there was a brown fluid filled blister abscess on that foot which she drained until clear serous drainage yesterday when she saw him. She relayed patient was refusing to be seen anywhere for this issue. She is asking if an abx could be prescribed for him at this time, she understands the provider has not seen patient for this issue and would most likely be unable to prescribe anything at this time and wanted to ask. Patient did have a virtual visit with the provider on 01/24/24. I advised I would forward message to provider at this time.    Any additional questions Ketty can be reached back at [phone] or [phone] extension [phone]

## 2024-08-16 ENCOUNTER — TELEPHONE (OUTPATIENT)
Dept: FAMILY MEDICINE CLINIC | Facility: CLINIC | Age: 74
End: 2024-08-16

## 2024-08-16 ENCOUNTER — TELEPHONE (OUTPATIENT)
Age: 74
End: 2024-08-16

## 2024-08-16 NOTE — TELEPHONE ENCOUNTER
Left message for patient to call back to schedule appointment with Dr. Lomas for a diabetic follow up.    Last OV 7/6/23 (in office)

## 2024-08-16 NOTE — TELEPHONE ENCOUNTER
SN from [facility] called to report that patient's R foot is now healed and closed. Patient will likely be discharged in the next 2 weeks from home health care. Patient will be reminded by the home health nurse to schedule a visit with the PCP. If needed Crystal can be reached at [phone].

## 2024-09-06 ENCOUNTER — TELEPHONE (OUTPATIENT)
Age: 74
End: 2024-09-06

## 2024-09-06 NOTE — TELEPHONE ENCOUNTER
Reviewed patient is over due for an appointment. He has not had DM labs in over 1 year. Please schedule.

## 2024-09-06 NOTE — TELEPHONE ENCOUNTER
Patient was d/c from home health care yesterday. His wounds are all healed and he is doing great.

## 2024-09-29 NOTE — PATIENT INSTRUCTIONS
Please follow up with podiatry ASAP. Please have wound care send me visit notes. Get your CT scan. Work on diet to help improve blood sugar. Recheck in 3 months.
Yes...

## 2024-11-09 ENCOUNTER — VBI (OUTPATIENT)
Dept: ADMINISTRATIVE | Facility: OTHER | Age: 74
End: 2024-11-09

## 2024-11-09 NOTE — TELEPHONE ENCOUNTER
11/09/24 5:26 PM     Chart reviewed for Hemoglobin A1c ; nothing is submitted to the patient's insurance at this time.     Edyta Coates   [facility]

## 2024-12-05 ENCOUNTER — TELEPHONE (OUTPATIENT)
Dept: FAMILY MEDICINE CLINIC | Facility: CLINIC | Age: 74
End: 2024-12-05

## 2024-12-05 NOTE — TELEPHONE ENCOUNTER
Left message for patient to call our office to schedule diabetic follow up.  Last visit 1/24/24 was virtual. Last visit in office 7/6/23.   Patient: Dorota Wray; 75 year old  YOB: 1949   MRN: [id number]  Today's Date: 11/11/2024    Chief Complaint   Patient presents with    Hip     right    Office Visit       HISTORY OF PRESENT ILLNESS:  I had the pleasure of seeing Dorota Wray, 75 year old female in the [facility] for a follow-up visit regarding her ongoing right hip pain.  At their last visit on 10/10/2024, trochanteric bursa injection performed under US guidance which was tolerated well.  Has done 3 PT sessions with Verito George PT.    Today, patient reports some relief from the cortisone injection 10/10/2024 but has multiple sources of pain.    About 10 days ago, had a fall at a gas station.  Was filling up her car, went to step over the hose to throw out some trash and was tripped up, falling to the ground directly onto her knees.  Her knees are sore and tender to touch, mild abrasions of both knees.  Feels that her right hip, groin pain has worsened.  No longer c/o lateral hip pain. Has intermittent episodes of radiating pain down to ankle.  Feels that PT is helping some especially with her range of motion, but not is not fully resolved and her pain is worsening.  Has a very difficult time getting around and is walking with 2 canes now for support.  Flexeril has helped.      REVIEW OF SYSTEMS: See HPI       PAST MEDICAL, SURGICAL, FAMILY HISTORY: These were reviewed and updated in the medical record along with current medications and allergies as necessary.       Objective:  There were no vitals taken for this visit.   General:  Well-appearing in no acute distress  Head:  Nontraumatic, no facial trauma  Skin:  No rashes or lesions  Cardiovascular:  Distal pulses 2+ in all extremities  Neuro:  Sensation to light touch intact in all extremities.  Cranial nerves grossly intact.   MSK:  Right Hip Exam:  Gait: Non-antalgic  Leg-length discrepancy: None  Appearance: No warmth, erythema, obvious effusion  ROM:     Flexion: 120°; moderate pain  External rotation:  45°; moderate pain.  Contralateral hip external rotation to 55 degrees without pain  Internal rotation:  15°; significant pain.  Contralateral hip internal rotation to 45 degrees without pain  Hamstring Angle (90/90 test):  15°  Lumbar flexion:  60°, normal  Lumbar extension:  20°, normal  Palpation:    Hip Flexors: Mild tenderness   Medial Joint: mild tenderness  Posterior aspect:  tender glute  Medial thigh:  No tenderness  Lateral aspect: mildly tender trochanter  SI and Sacrum: tender  Strength:  5/5 in hip flexion, extension, adduction, and abduction  Special Tests:  Logroll test positive with internal rotation  NAINA Negative  FADIR: Very positive  Stinchfield test: Positive, lateral pain reproduced  Straight leg raise and slump test: Negative   Snapping hip range of motion test: Negative     Knee Exam:   Side - right  Alignment - Neutral   No warmth, erythema, or obvious effusion.  Bilateral abrasions that appear well healing, no signs of infection.  ROM is from 0° to 120°   Tenderness:  Patella, patellar tendon: no tenderness  MCL: no tenderness   Pes anserine: No tenderness  Popliteal fossa: no tenderness  Medial joint line - mildly tender    Lateral joint line - No tenderness.   Tibial tuberosity is mildly tender.   Strength in flexion and extension was 5/5.    Special Tests:  Patellofemoral grind test - Negative  Lachman's test - Negative  Anterior Drawer Test  - Negative  Posterior drawer test - Negative  Varus stress test - Negative  Valgus stress test - Negative  Squat test (Thessaly test) - Negative   MacMurray test - Negative    IMAGING STUDIES:    X-ray of the right hip from 8/19/2024: Moderate degenerative change of the right hip joint     2. XR KNEE 3 VIEWS RIGHT AND AP STANDING BILATERAL, 10/10/2024: No distinct acute fracture line or dislocation is visualized. Mild degenerative changes at the bilateral knees. MRI can be considered if there  is  concern for occult injury or soft tissue/ligamentous process.    Assessment/Plan:  Dorota Wray is a 75 year old female with:  Other secondary osteoarthritis of right hip  - SERVICE TO ORTHOPEDICS    Contusion of right knee, initial encounter    Sacroiliac joint dysfunction of right side    Trochanteric bursitis of right hip    Personal history of fall    Previous imaging reviewed.  Symptoms have not resolved with both conservative and interventional options.  We injected the hip joint 09/04/2024 and trochanteric bursa 10/10/2024, both with good relief but lasted only temporarily.  At this point, given her moderate right hip OA, provocative exam and history of falls would recommend referral for ortho consult to discuss her surgical options.  Recommend Dr Kelvin Amaya.  RTC as needed following surgical consult.  Continue with upcoming PT visits and Flexeril only at night.      Ortho surgery referral placed.   Continue PT as scheduled.  Take flexeril as prescribed at night.  RTC as needed after ortho consult.      Guillermo BATEMAN LAT documented and recorded today's clinic visit that was performed and confirmed by Dr. Leander Alva.    The documentation recorded by Guillermo Naik LAT accurately and completely reflects the service(s) I personally performed and the decisions made by me.            Leander Alva MD, MS  Sports Medicine  [facility]

## 2024-12-29 ENCOUNTER — VBI (OUTPATIENT)
Dept: ADMINISTRATIVE | Facility: OTHER | Age: 74
End: 2024-12-29

## 2024-12-30 NOTE — TELEPHONE ENCOUNTER
12/29/24 7:38 PM     Chart reviewed for Hemoglobin A1c ; nothing is submitted to the patient's insurance at this time.     Edyta Coates   [facility]

## 2025-02-17 ENCOUNTER — TELEPHONE (OUTPATIENT)
Dept: FAMILY MEDICINE CLINIC | Facility: CLINIC | Age: 75
End: 2025-02-17

## 2025-06-27 ENCOUNTER — VBI (OUTPATIENT)
Dept: ADMINISTRATIVE | Facility: OTHER | Age: 75
End: 2025-06-27

## 2025-06-27 NOTE — TELEPHONE ENCOUNTER
06/27/25 1:54 PM     Chart reviewed for CRC: Colonoscopy ; nothing is submitted to the patient's insurance at this time.     Edyta Coates [facility]

## 2025-07-27 ENCOUNTER — HOSPITAL ENCOUNTER (INPATIENT)
Facility: HOSPITAL | Age: 75
LOS: 9 days | Discharge: NURSING, REHAB OR LONG-TERM CARE | DRG: 280 | End: 2025-08-05
Attending: EMERGENCY MEDICINE | Admitting: INTERNAL MEDICINE
Payer: COMMERCIAL

## 2025-07-27 ENCOUNTER — APPOINTMENT (EMERGENCY)
Dept: CT IMAGING | Facility: HOSPITAL | Age: 75
DRG: 280 | End: 2025-07-27
Payer: COMMERCIAL

## 2025-07-27 DIAGNOSIS — Z79.4 TYPE 2 DIABETES MELLITUS WITHOUT COMPLICATION, WITH LONG-TERM CURRENT USE OF INSULIN (HCC): ICD-10-CM

## 2025-07-27 DIAGNOSIS — E11.9 TYPE 2 DIABETES MELLITUS WITHOUT COMPLICATION, WITH LONG-TERM CURRENT USE OF INSULIN (HCC): ICD-10-CM

## 2025-07-27 DIAGNOSIS — J18.9 MULTIFOCAL PNEUMONIA: ICD-10-CM

## 2025-07-27 DIAGNOSIS — L97.519 ULCER OF RIGHT FOOT, UNSPECIFIED ULCER STAGE (HCC): ICD-10-CM

## 2025-07-27 DIAGNOSIS — E43 SEVERE PROTEIN-CALORIE MALNUTRITION (HCC): ICD-10-CM

## 2025-07-27 DIAGNOSIS — I21.4 NSTEMI (NON-ST ELEVATED MYOCARDIAL INFARCTION) (HCC): ICD-10-CM

## 2025-07-27 DIAGNOSIS — R53.1 WEAKNESS: Primary | ICD-10-CM

## 2025-07-27 DIAGNOSIS — L97.519 ULCER OF RIGHT FOOT (HCC): ICD-10-CM

## 2025-07-27 PROBLEM — Z85.72 HISTORY OF LYMPHOMA: Status: ACTIVE | Noted: 2025-07-27

## 2025-07-27 PROBLEM — I65.22 CAROTID STENOSIS, ASYMPTOMATIC, LEFT: Status: ACTIVE | Noted: 2025-07-27

## 2025-07-27 PROBLEM — R33.9 URINARY RETENTION: Status: ACTIVE | Noted: 2025-07-27

## 2025-07-27 LAB
2HR DELTA HS TROPONIN: 123 NG/L
4HR DELTA HS TROPONIN: 1718 NG/L
ALBUMIN SERPL BCG-MCNC: 2.8 G/DL (ref 3.5–5)
ALP SERPL-CCNC: 57 U/L (ref 34–104)
ALT SERPL W P-5'-P-CCNC: 10 U/L (ref 7–52)
ANION GAP SERPL CALCULATED.3IONS-SCNC: 11 MMOL/L (ref 4–13)
ANISOCYTOSIS BLD QL SMEAR: PRESENT
APTT PPP: >210 SECONDS (ref 23–34)
AST SERPL W P-5'-P-CCNC: 19 U/L (ref 13–39)
ATRIAL RATE: 202 BPM
BACTERIA UR QL AUTO: ABNORMAL /HPF
BASOPHILS # BLD MANUAL: 0 THOUSAND/UL (ref 0–0.1)
BASOPHILS NFR MAR MANUAL: 0 % (ref 0–1)
BILIRUB DIRECT SERPL-MCNC: 0.36 MG/DL (ref 0–0.2)
BILIRUB SERPL-MCNC: 0.64 MG/DL (ref 0.2–1)
BILIRUB UR QL STRIP: NEGATIVE
BNP SERPL-MCNC: 110 PG/ML (ref 0–100)
BUN SERPL-MCNC: 37 MG/DL (ref 5–25)
BURR CELLS BLD QL SMEAR: PRESENT
CALCIUM SERPL-MCNC: 8.9 MG/DL (ref 8.4–10.2)
CARDIAC TROPONIN I PNL SERPL HS: 2495 NG/L (ref ?–50)
CARDIAC TROPONIN I PNL SERPL HS: 777 NG/L (ref ?–50)
CARDIAC TROPONIN I PNL SERPL HS: 900 NG/L (ref ?–50)
CHLORIDE SERPL-SCNC: 106 MMOL/L (ref 96–108)
CHOLEST SERPL-MCNC: 61 MG/DL (ref ?–200)
CK SERPL-CCNC: 157 U/L (ref 39–308)
CLARITY UR: CLEAR
CO2 SERPL-SCNC: 26 MMOL/L (ref 21–32)
COLOR UR: YELLOW
CREAT SERPL-MCNC: 1.15 MG/DL (ref 0.6–1.3)
EOSINOPHIL # BLD MANUAL: 0 THOUSAND/UL (ref 0–0.4)
EOSINOPHIL NFR BLD MANUAL: 0 % (ref 0–6)
ERYTHROCYTE [DISTWIDTH] IN BLOOD BY AUTOMATED COUNT: 15 % (ref 11.6–15.1)
EST. AVERAGE GLUCOSE BLD GHB EST-MCNC: 146 MG/DL
FLUAV RNA RESP QL NAA+PROBE: NEGATIVE
FLUBV RNA RESP QL NAA+PROBE: NEGATIVE
GFR SERPL CREATININE-BSD FRML MDRD: 62 ML/MIN/1.73SQ M
GLUCOSE SERPL-MCNC: 148 MG/DL (ref 65–140)
GLUCOSE SERPL-MCNC: 155 MG/DL (ref 65–140)
GLUCOSE SERPL-MCNC: 162 MG/DL (ref 65–140)
GLUCOSE UR STRIP-MCNC: ABNORMAL MG/DL
HBA1C MFR BLD: 6.7 %
HCT VFR BLD AUTO: 31.7 % (ref 36.5–49.3)
HDLC SERPL-MCNC: 29 MG/DL
HGB BLD-MCNC: 10.4 G/DL (ref 12–17)
HGB UR QL STRIP.AUTO: ABNORMAL
HYALINE CASTS #/AREA URNS LPF: ABNORMAL /LPF
INR PPP: 1.59 (ref 0.85–1.19)
KETONES UR STRIP-MCNC: ABNORMAL MG/DL
LACTATE SERPL-SCNC: 1.6 MMOL/L (ref 0.5–2)
LACTATE SERPL-SCNC: 2.1 MMOL/L (ref 0.5–2)
LACTATE SERPL-SCNC: 2.6 MMOL/L (ref 0.5–2)
LDLC SERPL CALC-MCNC: 20 MG/DL (ref 0–100)
LEUKOCYTE ESTERASE UR QL STRIP: NEGATIVE
LG PLATELETS BLD QL SMEAR: PRESENT
LIPASE SERPL-CCNC: <6 U/L (ref 11–82)
LYMPHOCYTES # BLD AUTO: 0.78 THOUSAND/UL (ref 0.6–4.47)
LYMPHOCYTES # BLD AUTO: 7 % (ref 14–44)
MAGNESIUM SERPL-MCNC: 2.3 MG/DL (ref 1.9–2.7)
MCH RBC QN AUTO: 33.4 PG (ref 26.8–34.3)
MCHC RBC AUTO-ENTMCNC: 32.8 G/DL (ref 31.4–37.4)
MCV RBC AUTO: 102 FL (ref 82–98)
MONOCYTES # BLD AUTO: 0.45 THOUSAND/UL (ref 0–1.22)
MONOCYTES NFR BLD: 4 % (ref 4–12)
MUCOUS THREADS UR QL AUTO: ABNORMAL
NEUTROPHILS # BLD MANUAL: 9.98 THOUSAND/UL (ref 1.85–7.62)
NEUTS BAND NFR BLD MANUAL: 6 % (ref 0–8)
NEUTS SEG NFR BLD AUTO: 83 % (ref 43–75)
NITRITE UR QL STRIP: NEGATIVE
NON-SQ EPI CELLS URNS QL MICRO: ABNORMAL /HPF
NONHDLC SERPL-MCNC: 32 MG/DL
OVALOCYTES BLD QL SMEAR: PRESENT
PH UR STRIP.AUTO: 5.5 [PH]
PLATELET # BLD AUTO: 200 THOUSANDS/UL (ref 149–390)
PLATELET BLD QL SMEAR: ADEQUATE
PLATELET CLUMP BLD QL SMEAR: PRESENT
PMV BLD AUTO: 10.9 FL (ref 8.9–12.7)
POIKILOCYTOSIS BLD QL SMEAR: PRESENT
POLYCHROMASIA BLD QL SMEAR: PRESENT
POTASSIUM SERPL-SCNC: 3.8 MMOL/L (ref 3.5–5.3)
PROT SERPL-MCNC: 7.4 G/DL (ref 6.4–8.4)
PROT UR STRIP-MCNC: ABNORMAL MG/DL
PROTHROMBIN TIME: 19.2 SECONDS (ref 12.3–15)
QRS AXIS: 58 DEGREES
QRSD INTERVAL: 70 MS
QT INTERVAL: 314 MS
QTC INTERVAL: 388 MS
RBC # BLD AUTO: 3.11 MILLION/UL (ref 3.88–5.62)
RBC #/AREA URNS AUTO: ABNORMAL /HPF
RBC MORPH BLD: PRESENT
RSV RNA RESP QL NAA+PROBE: NEGATIVE
SARS-COV-2 RNA RESP QL NAA+PROBE: NEGATIVE
SODIUM SERPL-SCNC: 143 MMOL/L (ref 135–147)
SP GR UR STRIP.AUTO: 1.02 (ref 1–1.03)
SPHEROCYTES BLD QL SMEAR: PRESENT
T WAVE AXIS: 127 DEGREES
TRANS CELLS #/AREA URNS HPF: PRESENT /[HPF]
TRIGL SERPL-MCNC: 61 MG/DL (ref ?–150)
TSH SERPL DL<=0.05 MIU/L-ACNC: 1.68 UIU/ML (ref 0.45–4.5)
UROBILINOGEN UR STRIP-ACNC: <2 MG/DL
VENTRICULAR RATE: 92 BPM
WBC # BLD AUTO: 11.21 THOUSAND/UL (ref 4.31–10.16)
WBC #/AREA URNS AUTO: ABNORMAL /HPF

## 2025-07-27 PROCEDURE — 85007 BL SMEAR W/DIFF WBC COUNT: CPT

## 2025-07-27 PROCEDURE — 93010 ELECTROCARDIOGRAM REPORT: CPT | Performed by: INTERNAL MEDICINE

## 2025-07-27 PROCEDURE — 99285 EMERGENCY DEPT VISIT HI MDM: CPT

## 2025-07-27 PROCEDURE — 70450 CT HEAD/BRAIN W/O DYE: CPT

## 2025-07-27 PROCEDURE — 83036 HEMOGLOBIN GLYCOSYLATED A1C: CPT | Performed by: INTERNAL MEDICINE

## 2025-07-27 PROCEDURE — 87449 NOS EACH ORGANISM AG IA: CPT | Performed by: INTERNAL MEDICINE

## 2025-07-27 PROCEDURE — 74177 CT ABD & PELVIS W/CONTRAST: CPT

## 2025-07-27 PROCEDURE — 93005 ELECTROCARDIOGRAM TRACING: CPT

## 2025-07-27 PROCEDURE — 82550 ASSAY OF CK (CPK): CPT

## 2025-07-27 PROCEDURE — 96375 TX/PRO/DX INJ NEW DRUG ADDON: CPT

## 2025-07-27 PROCEDURE — 99223 1ST HOSP IP/OBS HIGH 75: CPT | Performed by: INTERNAL MEDICINE

## 2025-07-27 PROCEDURE — 83605 ASSAY OF LACTIC ACID: CPT

## 2025-07-27 PROCEDURE — 81001 URINALYSIS AUTO W/SCOPE: CPT

## 2025-07-27 PROCEDURE — 85027 COMPLETE CBC AUTOMATED: CPT

## 2025-07-27 PROCEDURE — 83880 ASSAY OF NATRIURETIC PEPTIDE: CPT

## 2025-07-27 PROCEDURE — 84443 ASSAY THYROID STIM HORMONE: CPT

## 2025-07-27 PROCEDURE — 84484 ASSAY OF TROPONIN QUANT: CPT

## 2025-07-27 PROCEDURE — 96367 TX/PROPH/DG ADDL SEQ IV INF: CPT

## 2025-07-27 PROCEDURE — 71275 CT ANGIOGRAPHY CHEST: CPT

## 2025-07-27 PROCEDURE — 82948 REAGENT STRIP/BLOOD GLUCOSE: CPT

## 2025-07-27 PROCEDURE — 96368 THER/DIAG CONCURRENT INF: CPT

## 2025-07-27 PROCEDURE — 36415 COLL VENOUS BLD VENIPUNCTURE: CPT

## 2025-07-27 PROCEDURE — 83690 ASSAY OF LIPASE: CPT

## 2025-07-27 PROCEDURE — 80076 HEPATIC FUNCTION PANEL: CPT

## 2025-07-27 PROCEDURE — 96365 THER/PROPH/DIAG IV INF INIT: CPT

## 2025-07-27 PROCEDURE — 80061 LIPID PANEL: CPT

## 2025-07-27 PROCEDURE — 87637 SARSCOV2&INF A&B&RSV AMP PRB: CPT

## 2025-07-27 PROCEDURE — RECHECK: Performed by: INTERNAL MEDICINE

## 2025-07-27 PROCEDURE — 85730 THROMBOPLASTIN TIME PARTIAL: CPT

## 2025-07-27 PROCEDURE — 83605 ASSAY OF LACTIC ACID: CPT | Performed by: INTERNAL MEDICINE

## 2025-07-27 PROCEDURE — 80048 BASIC METABOLIC PNL TOTAL CA: CPT

## 2025-07-27 PROCEDURE — 96366 THER/PROPH/DIAG IV INF ADDON: CPT

## 2025-07-27 PROCEDURE — 85610 PROTHROMBIN TIME: CPT

## 2025-07-27 PROCEDURE — 83735 ASSAY OF MAGNESIUM: CPT

## 2025-07-27 RX ORDER — ASPIRIN 81 MG/1
324 TABLET, CHEWABLE ORAL ONCE
Status: DISCONTINUED | OUTPATIENT
Start: 2025-07-27 | End: 2025-07-29

## 2025-07-27 RX ORDER — ASPIRIN 81 MG/1
81 TABLET ORAL DAILY
Status: DISCONTINUED | OUTPATIENT
Start: 2025-07-28 | End: 2025-07-29

## 2025-07-27 RX ORDER — TICAGRELOR 90 MG/1
90 TABLET, FILM COATED ORAL EVERY 12 HOURS SCHEDULED
Status: DISCONTINUED | OUTPATIENT
Start: 2025-07-28 | End: 2025-07-29

## 2025-07-27 RX ORDER — ACETAMINOPHEN 160 MG/5ML
650 SUSPENSION ORAL EVERY 4 HOURS PRN
Status: DISCONTINUED | OUTPATIENT
Start: 2025-07-27 | End: 2025-08-05 | Stop reason: HOSPADM

## 2025-07-27 RX ORDER — HEPARIN SODIUM 10000 [USP'U]/100ML
3-20 INJECTION, SOLUTION INTRAVENOUS
Status: DISCONTINUED | OUTPATIENT
Start: 2025-07-27 | End: 2025-07-28

## 2025-07-27 RX ORDER — TICAGRELOR 90 MG/1
180 TABLET, FILM COATED ORAL ONCE
Status: DISCONTINUED | OUTPATIENT
Start: 2025-07-27 | End: 2025-07-29

## 2025-07-27 RX ORDER — HYDROMORPHONE HCL IN WATER/PF 6 MG/30 ML
0.2 PATIENT CONTROLLED ANALGESIA SYRINGE INTRAVENOUS EVERY 4 HOURS PRN
Refills: 0 | Status: DISCONTINUED | OUTPATIENT
Start: 2025-07-27 | End: 2025-08-05 | Stop reason: HOSPADM

## 2025-07-27 RX ORDER — HEPARIN SODIUM 1000 [USP'U]/ML
3300 INJECTION, SOLUTION INTRAVENOUS; SUBCUTANEOUS ONCE
Status: COMPLETED | OUTPATIENT
Start: 2025-07-27 | End: 2025-07-27

## 2025-07-27 RX ORDER — HEPARIN SODIUM 1000 [USP'U]/ML
3300 INJECTION, SOLUTION INTRAVENOUS; SUBCUTANEOUS EVERY 6 HOURS PRN
Status: DISCONTINUED | OUTPATIENT
Start: 2025-07-27 | End: 2025-07-28

## 2025-07-27 RX ORDER — INSULIN LISPRO 100 [IU]/ML
1-5 INJECTION, SOLUTION INTRAVENOUS; SUBCUTANEOUS
Status: DISCONTINUED | OUTPATIENT
Start: 2025-07-27 | End: 2025-07-28

## 2025-07-27 RX ORDER — ATORVASTATIN CALCIUM 40 MG/1
40 TABLET, FILM COATED ORAL
Status: DISCONTINUED | OUTPATIENT
Start: 2025-07-28 | End: 2025-07-29

## 2025-07-27 RX ORDER — HEPARIN SODIUM 1000 [USP'U]/ML
1650 INJECTION, SOLUTION INTRAVENOUS; SUBCUTANEOUS EVERY 6 HOURS PRN
Status: DISCONTINUED | OUTPATIENT
Start: 2025-07-27 | End: 2025-07-28

## 2025-07-27 RX ORDER — ONDANSETRON 2 MG/ML
4 INJECTION INTRAMUSCULAR; INTRAVENOUS EVERY 4 HOURS PRN
Status: DISCONTINUED | OUTPATIENT
Start: 2025-07-27 | End: 2025-08-05 | Stop reason: HOSPADM

## 2025-07-27 RX ORDER — SODIUM CHLORIDE, SODIUM GLUCONATE, SODIUM ACETATE, POTASSIUM CHLORIDE, MAGNESIUM CHLORIDE, SODIUM PHOSPHATE, DIBASIC, AND POTASSIUM PHOSPHATE .53; .5; .37; .037; .03; .012; .00082 G/100ML; G/100ML; G/100ML; G/100ML; G/100ML; G/100ML; G/100ML
100 INJECTION, SOLUTION INTRAVENOUS CONTINUOUS
Status: DISCONTINUED | OUTPATIENT
Start: 2025-07-27 | End: 2025-07-28

## 2025-07-27 RX ADMIN — IOHEXOL 100 ML: 350 INJECTION, SOLUTION INTRAVENOUS at 13:21

## 2025-07-27 RX ADMIN — DEXTROSE 2000 MG: 50 INJECTION, SOLUTION INTRAVENOUS at 14:14

## 2025-07-27 RX ADMIN — SODIUM CHLORIDE, SODIUM LACTATE, POTASSIUM CHLORIDE, AND CALCIUM CHLORIDE 1000 ML: .6; .31; .03; .02 INJECTION, SOLUTION INTRAVENOUS at 12:07

## 2025-07-27 RX ADMIN — HEPARIN SODIUM 12 UNITS/KG/HR: 10000 INJECTION, SOLUTION INTRAVENOUS at 13:35

## 2025-07-27 RX ADMIN — SODIUM CHLORIDE, SODIUM GLUCONATE, SODIUM ACETATE, POTASSIUM CHLORIDE, MAGNESIUM CHLORIDE, SODIUM PHOSPHATE, DIBASIC, AND POTASSIUM PHOSPHATE 100 ML/HR: .53; .5; .37; .037; .03; .012; .00082 INJECTION, SOLUTION INTRAVENOUS at 17:20

## 2025-07-27 RX ADMIN — AZITHROMYCIN MONOHYDRATE 500 MG: 500 INJECTION, POWDER, LYOPHILIZED, FOR SOLUTION INTRAVENOUS at 17:35

## 2025-07-27 RX ADMIN — HEPARIN SODIUM 3300 UNITS: 1000 INJECTION, SOLUTION INTRAVENOUS; SUBCUTANEOUS at 13:36

## 2025-07-28 ENCOUNTER — APPOINTMENT (INPATIENT)
Dept: NON INVASIVE DIAGNOSTICS | Facility: HOSPITAL | Age: 75
DRG: 280 | End: 2025-07-28
Payer: COMMERCIAL

## 2025-07-28 ENCOUNTER — APPOINTMENT (INPATIENT)
Dept: RADIOLOGY | Facility: HOSPITAL | Age: 75
DRG: 280 | End: 2025-07-28
Payer: COMMERCIAL

## 2025-07-28 PROBLEM — L97.414 DIABETIC ULCER OF RIGHT HEEL ASSOCIATED WITH TYPE 2 DIABETES MELLITUS, WITH NECROSIS OF BONE (HCC): Status: ACTIVE | Noted: 2025-07-28

## 2025-07-28 PROBLEM — Z51.5 HOSPICE CARE: Status: ACTIVE | Noted: 2025-07-28

## 2025-07-28 PROBLEM — E11.621 DIABETIC ULCER OF LEFT MIDFOOT ASSOCIATED WITH TYPE 2 DIABETES MELLITUS, WITH MUSCLE INVOLVEMENT WITHOUT EVIDENCE OF NECROSIS (HCC): Status: ACTIVE | Noted: 2025-07-28

## 2025-07-28 PROBLEM — L97.425 DIABETIC ULCER OF LEFT MIDFOOT ASSOCIATED WITH TYPE 2 DIABETES MELLITUS, WITH MUSCLE INVOLVEMENT WITHOUT EVIDENCE OF NECROSIS (HCC): Status: ACTIVE | Noted: 2025-07-28

## 2025-07-28 PROBLEM — E43 SEVERE PROTEIN-CALORIE MALNUTRITION (HCC): Status: ACTIVE | Noted: 2025-07-28

## 2025-07-28 PROBLEM — E11.621 DIABETIC ULCER OF RIGHT HEEL ASSOCIATED WITH TYPE 2 DIABETES MELLITUS, WITH NECROSIS OF BONE (HCC): Status: ACTIVE | Noted: 2025-07-28

## 2025-07-28 PROBLEM — E87.6 HYPOKALEMIA: Status: ACTIVE | Noted: 2025-07-28

## 2025-07-28 LAB
ALBUMIN SERPL BCG-MCNC: 2.3 G/DL (ref 3.5–5)
ALP SERPL-CCNC: 52 U/L (ref 34–104)
ALT SERPL W P-5'-P-CCNC: 15 U/L (ref 7–52)
ANION GAP SERPL CALCULATED.3IONS-SCNC: 7 MMOL/L (ref 4–13)
APTT PPP: 37 SECONDS (ref 23–34)
APTT PPP: 55 SECONDS (ref 23–34)
APTT PPP: 59 SECONDS (ref 23–34)
AST SERPL W P-5'-P-CCNC: 62 U/L (ref 13–39)
ATRIAL RATE: 288 BPM
BILIRUB SERPL-MCNC: 0.45 MG/DL (ref 0.2–1)
BUN SERPL-MCNC: 21 MG/DL (ref 5–25)
CALCIUM ALBUM COR SERPL-MCNC: 9.5 MG/DL (ref 8.3–10.1)
CALCIUM SERPL-MCNC: 8.1 MG/DL (ref 8.4–10.2)
CARDIAC TROPONIN I PNL SERPL HS: ABNORMAL NG/L (ref 8–18)
CARDIAC TROPONIN I PNL SERPL HS: ABNORMAL NG/L (ref 8–18)
CHLORIDE SERPL-SCNC: 107 MMOL/L (ref 96–108)
CO2 SERPL-SCNC: 30 MMOL/L (ref 21–32)
CREAT SERPL-MCNC: 0.64 MG/DL (ref 0.6–1.3)
ERYTHROCYTE [DISTWIDTH] IN BLOOD BY AUTOMATED COUNT: 15 % (ref 11.6–15.1)
GFR SERPL CREATININE-BSD FRML MDRD: 97 ML/MIN/1.73SQ M
GLUCOSE SERPL-MCNC: 119 MG/DL (ref 65–140)
GLUCOSE SERPL-MCNC: 119 MG/DL (ref 65–140)
GLUCOSE SERPL-MCNC: 129 MG/DL (ref 65–140)
GLUCOSE SERPL-MCNC: 137 MG/DL (ref 65–140)
HCT VFR BLD AUTO: 27 % (ref 36.5–49.3)
HGB BLD-MCNC: 9 G/DL (ref 12–17)
L PNEUMO1 AG UR QL IA.RAPID: NEGATIVE
MCH RBC QN AUTO: 33.3 PG (ref 26.8–34.3)
MCHC RBC AUTO-ENTMCNC: 33.3 G/DL (ref 31.4–37.4)
MCV RBC AUTO: 100 FL (ref 82–98)
P AXIS: 50 DEGREES
PLATELET # BLD AUTO: 158 THOUSANDS/UL (ref 149–390)
PMV BLD AUTO: 10.9 FL (ref 8.9–12.7)
POTASSIUM SERPL-SCNC: 2.8 MMOL/L (ref 3.5–5.3)
POTASSIUM SERPL-SCNC: 3.1 MMOL/L (ref 3.5–5.3)
PROCALCITONIN SERPL-MCNC: 2.38 NG/ML
PROT SERPL-MCNC: 6.2 G/DL (ref 6.4–8.4)
QRS AXIS: 55 DEGREES
QRSD INTERVAL: 80 MS
QT INTERVAL: 360 MS
QTC INTERVAL: 420 MS
RBC # BLD AUTO: 2.7 MILLION/UL (ref 3.88–5.62)
S PNEUM AG UR QL: NEGATIVE
SODIUM SERPL-SCNC: 144 MMOL/L (ref 135–147)
T WAVE AXIS: 126 DEGREES
VENTRICULAR RATE: 82 BPM
WBC # BLD AUTO: 10.68 THOUSAND/UL (ref 4.31–10.16)

## 2025-07-28 PROCEDURE — 85730 THROMBOPLASTIN TIME PARTIAL: CPT | Performed by: FAMILY MEDICINE

## 2025-07-28 PROCEDURE — 84484 ASSAY OF TROPONIN QUANT: CPT | Performed by: INTERNAL MEDICINE

## 2025-07-28 PROCEDURE — 85027 COMPLETE CBC AUTOMATED: CPT | Performed by: INTERNAL MEDICINE

## 2025-07-28 PROCEDURE — 80053 COMPREHEN METABOLIC PANEL: CPT | Performed by: INTERNAL MEDICINE

## 2025-07-28 PROCEDURE — 93306 TTE W/DOPPLER COMPLETE: CPT | Performed by: INTERNAL MEDICINE

## 2025-07-28 PROCEDURE — NC001 PR NO CHARGE: Performed by: PODIATRIST

## 2025-07-28 PROCEDURE — 85730 THROMBOPLASTIN TIME PARTIAL: CPT | Performed by: INTERNAL MEDICINE

## 2025-07-28 PROCEDURE — 73630 X-RAY EXAM OF FOOT: CPT

## 2025-07-28 PROCEDURE — 92610 EVALUATE SWALLOWING FUNCTION: CPT

## 2025-07-28 PROCEDURE — 93010 ELECTROCARDIOGRAM REPORT: CPT | Performed by: INTERNAL MEDICINE

## 2025-07-28 PROCEDURE — 82948 REAGENT STRIP/BLOOD GLUCOSE: CPT

## 2025-07-28 PROCEDURE — 99223 1ST HOSP IP/OBS HIGH 75: CPT | Performed by: INTERNAL MEDICINE

## 2025-07-28 PROCEDURE — 84132 ASSAY OF SERUM POTASSIUM: CPT | Performed by: FAMILY MEDICINE

## 2025-07-28 PROCEDURE — 84145 PROCALCITONIN (PCT): CPT | Performed by: INTERNAL MEDICINE

## 2025-07-28 PROCEDURE — 93306 TTE W/DOPPLER COMPLETE: CPT

## 2025-07-28 PROCEDURE — 99232 SBSQ HOSP IP/OBS MODERATE 35: CPT | Performed by: FAMILY MEDICINE

## 2025-07-28 RX ORDER — POTASSIUM CHLORIDE 29.8 MG/ML
40 INJECTION INTRAVENOUS ONCE
Status: DISCONTINUED | OUTPATIENT
Start: 2025-07-28 | End: 2025-07-28 | Stop reason: SDUPTHER

## 2025-07-28 RX ORDER — POTASSIUM CHLORIDE 1500 MG/1
40 TABLET, EXTENDED RELEASE ORAL ONCE
Status: DISCONTINUED | OUTPATIENT
Start: 2025-07-28 | End: 2025-07-28

## 2025-07-28 RX ORDER — POTASSIUM CHLORIDE 14.9 MG/ML
20 INJECTION INTRAVENOUS ONCE
Status: COMPLETED | OUTPATIENT
Start: 2025-07-28 | End: 2025-07-28

## 2025-07-28 RX ORDER — POTASSIUM CHLORIDE 14.9 MG/ML
20 INJECTION INTRAVENOUS ONCE
Status: DISCONTINUED | OUTPATIENT
Start: 2025-07-28 | End: 2025-07-28

## 2025-07-28 RX ORDER — POTASSIUM CHLORIDE 14.9 MG/ML
20 INJECTION INTRAVENOUS ONCE
Status: DISCONTINUED | OUTPATIENT
Start: 2025-07-28 | End: 2025-08-05 | Stop reason: HOSPADM

## 2025-07-28 RX ORDER — POTASSIUM CHLORIDE 20MEQ/15ML
40 LIQUID (ML) ORAL ONCE
Status: DISCONTINUED | OUTPATIENT
Start: 2025-07-28 | End: 2025-07-28

## 2025-07-28 RX ORDER — POTASSIUM CHLORIDE 20MEQ/15ML
20 LIQUID (ML) ORAL ONCE
Status: DISCONTINUED | OUTPATIENT
Start: 2025-07-28 | End: 2025-07-28

## 2025-07-28 RX ORDER — POTASSIUM CHLORIDE 1500 MG/1
20 TABLET, EXTENDED RELEASE ORAL ONCE
Status: DISCONTINUED | OUTPATIENT
Start: 2025-07-28 | End: 2025-07-28

## 2025-07-28 RX ADMIN — AZITHROMYCIN MONOHYDRATE 500 MG: 500 INJECTION, POWDER, LYOPHILIZED, FOR SOLUTION INTRAVENOUS at 16:05

## 2025-07-28 RX ADMIN — DEXTROSE 1000 MG: 50 INJECTION, SOLUTION INTRAVENOUS at 16:05

## 2025-07-28 RX ADMIN — HEPARIN SODIUM 1650 UNITS: 1000 INJECTION, SOLUTION INTRAVENOUS; SUBCUTANEOUS at 16:04

## 2025-07-28 RX ADMIN — POTASSIUM CHLORIDE 20 MEQ: 14.9 INJECTION, SOLUTION INTRAVENOUS at 11:05

## 2025-07-28 RX ADMIN — SODIUM CHLORIDE, SODIUM GLUCONATE, SODIUM ACETATE, POTASSIUM CHLORIDE, MAGNESIUM CHLORIDE, SODIUM PHOSPHATE, DIBASIC, AND POTASSIUM PHOSPHATE 100 ML/HR: .53; .5; .37; .037; .03; .012; .00082 INJECTION, SOLUTION INTRAVENOUS at 01:36

## 2025-07-28 RX ADMIN — HEPARIN SODIUM 3300 UNITS: 1000 INJECTION, SOLUTION INTRAVENOUS; SUBCUTANEOUS at 00:56

## 2025-07-28 RX ADMIN — HEPARIN SODIUM 1650 UNITS: 1000 INJECTION, SOLUTION INTRAVENOUS; SUBCUTANEOUS at 07:38

## 2025-07-28 RX ADMIN — POTASSIUM CHLORIDE 20 MEQ: 14.9 INJECTION, SOLUTION INTRAVENOUS at 08:53

## 2025-07-29 ENCOUNTER — HOME CARE VISIT (OUTPATIENT)
Dept: HOME HEALTH SERVICES | Facility: HOME HEALTHCARE | Age: 75
End: 2025-07-29

## 2025-07-29 PROCEDURE — 99232 SBSQ HOSP IP/OBS MODERATE 35: CPT | Performed by: FAMILY MEDICINE

## 2025-07-30 LAB
BSA FOR ECHO PROCEDURE: 1.6 M2
E WAVE DECELERATION TIME: 275 MS
E/A RATIO: 0.91
FRACTIONAL SHORTENING: 26 (ref 28–44)
INTERVENTRICULAR SEPTUM IN DIASTOLE (PARASTERNAL SHORT AXIS VIEW): 1.1 CM
INTERVENTRICULAR SEPTUM: 1.1 CM (ref 0.6–1.1)
LAAS-AP4: 13.5 CM2
LEFT ATRIUM SIZE: 3.3 CM
LEFT INTERNAL DIMENSION IN SYSTOLE: 3.1 CM (ref 2.1–4)
LEFT VENTRICULAR INTERNAL DIMENSION IN DIASTOLE: 4.2 CM (ref 3.5–6)
LEFT VENTRICULAR POSTERIOR WALL IN END DIASTOLE: 1.2 CM
LEFT VENTRICULAR STROKE VOLUME: 40 ML
LV EF US.2D.A4C+ESTIMATED: 51 %
LVSV (TEICH): 40 ML
MV E'TISSUE VEL-LAT: 6 CM/S
MV E'TISSUE VEL-SEP: 8 CM/S
MV PEAK A VEL: 0.77 M/S
MV PEAK E VEL: 70 CM/S
SL CV LV EF: 52
SL CV PED ECHO LEFT VENTRICLE DIASTOLIC VOLUME (MOD BIPLANE) 2D: 78 ML
SL CV PED ECHO LEFT VENTRICLE SYSTOLIC VOLUME (MOD BIPLANE) 2D: 38 ML
TR MAX PG: 5 MMHG
TR PEAK VELOCITY: 1.1 M/S
TRICUSPID VALVE PEAK REGURGITATION VELOCITY: 1.12 M/S

## 2025-07-30 PROCEDURE — 99232 SBSQ HOSP IP/OBS MODERATE 35: CPT | Performed by: FAMILY MEDICINE

## 2025-07-31 PROBLEM — Z51.5 PALLIATIVE CARE ENCOUNTER: Status: ACTIVE | Noted: 2025-07-31

## 2025-07-31 PROCEDURE — 99223 1ST HOSP IP/OBS HIGH 75: CPT

## 2025-07-31 RX ORDER — LIDOCAINE HYDROCHLORIDE 20 MG/ML
15 SOLUTION OROPHARYNGEAL 4 TIMES DAILY PRN
Status: DISCONTINUED | OUTPATIENT
Start: 2025-07-31 | End: 2025-08-05 | Stop reason: HOSPADM

## 2025-08-01 PROCEDURE — 99232 SBSQ HOSP IP/OBS MODERATE 35: CPT | Performed by: INTERNAL MEDICINE

## 2025-08-02 PROCEDURE — 99232 SBSQ HOSP IP/OBS MODERATE 35: CPT | Performed by: INTERNAL MEDICINE

## 2025-08-03 PROCEDURE — 99231 SBSQ HOSP IP/OBS SF/LOW 25: CPT

## 2025-08-04 PROCEDURE — 99232 SBSQ HOSP IP/OBS MODERATE 35: CPT

## 2025-08-05 VITALS
HEIGHT: 66 IN | WEIGHT: 119.05 LBS | RESPIRATION RATE: 15 BRPM | HEART RATE: 78 BPM | TEMPERATURE: 97.5 F | OXYGEN SATURATION: 94 % | DIASTOLIC BLOOD PRESSURE: 56 MMHG | SYSTOLIC BLOOD PRESSURE: 103 MMHG | BODY MASS INDEX: 19.13 KG/M2

## 2025-08-05 PROCEDURE — 99239 HOSP IP/OBS DSCHRG MGMT >30: CPT

## 2025-08-08 ENCOUNTER — TELEPHONE (OUTPATIENT)
Dept: FAMILY MEDICINE CLINIC | Facility: CLINIC | Age: 75
End: 2025-08-08

## 2025-08-11 ENCOUNTER — DOCUMENTATION (OUTPATIENT)
Dept: ADMINISTRATIVE | Facility: OTHER | Age: 75
End: 2025-08-11

## 2025-08-26 PROBLEM — J18.9 MULTIFOCAL PNEUMONIA: Status: RESOLVED | Noted: 2025-07-27 | Resolved: 2025-08-26
